# Patient Record
Sex: MALE | Race: WHITE | NOT HISPANIC OR LATINO | Employment: OTHER | URBAN - METROPOLITAN AREA
[De-identification: names, ages, dates, MRNs, and addresses within clinical notes are randomized per-mention and may not be internally consistent; named-entity substitution may affect disease eponyms.]

---

## 2017-02-01 ENCOUNTER — HOSPITAL ENCOUNTER (OUTPATIENT)
Dept: LAB | Facility: MEDICAL CENTER | Age: 52
End: 2017-02-01
Attending: SPECIALIST
Payer: COMMERCIAL

## 2017-02-01 LAB
25(OH)D3 SERPL-MCNC: 13 NG/ML (ref 30–100)
ALBUMIN SERPL BCP-MCNC: 4.5 G/DL (ref 3.2–4.9)
ALBUMIN/GLOB SERPL: 1.4 G/DL
ALP SERPL-CCNC: 54 U/L (ref 30–99)
ALT SERPL-CCNC: 42 U/L (ref 2–50)
ANION GAP SERPL CALC-SCNC: 10 MMOL/L (ref 0–11.9)
AST SERPL-CCNC: 33 U/L (ref 12–45)
BASOPHILS # BLD AUTO: 0.05 K/UL (ref 0–0.12)
BASOPHILS NFR BLD AUTO: 1 % (ref 0–1.8)
BILIRUB SERPL-MCNC: 1 MG/DL (ref 0.1–1.5)
BUN SERPL-MCNC: 11 MG/DL (ref 8–22)
CALCIUM SERPL-MCNC: 9.5 MG/DL (ref 8.5–10.5)
CHLORIDE SERPL-SCNC: 102 MMOL/L (ref 96–112)
CHOLEST SERPL-MCNC: 269 MG/DL (ref 100–199)
CO2 SERPL-SCNC: 26 MMOL/L (ref 20–33)
CREAT SERPL-MCNC: 0.85 MG/DL (ref 0.5–1.4)
EOSINOPHIL # BLD: 0.26 K/UL (ref 0–0.51)
EOSINOPHIL NFR BLD AUTO: 5.3 % (ref 0–6.9)
ERYTHROCYTE [DISTWIDTH] IN BLOOD BY AUTOMATED COUNT: 42.1 FL (ref 35.9–50)
GLOBULIN SER CALC-MCNC: 3.2 G/DL (ref 1.9–3.5)
GLUCOSE SERPL-MCNC: 101 MG/DL (ref 65–99)
HCT VFR BLD AUTO: 52.3 % (ref 42–52)
HDLC SERPL-MCNC: 35 MG/DL
HGB BLD-MCNC: 17.6 G/DL (ref 14–18)
IMM GRANULOCYTES # BLD AUTO: 0.02 K/UL (ref 0–0.11)
IMM GRANULOCYTES NFR BLD AUTO: 0.4 % (ref 0–0.9)
LDLC SERPL CALC-MCNC: 169 MG/DL
LYMPHOCYTES # BLD: 1.26 K/UL (ref 1–4.8)
LYMPHOCYTES NFR BLD AUTO: 25.8 % (ref 22–41)
MCH RBC QN AUTO: 31.4 PG (ref 27–33)
MCHC RBC AUTO-ENTMCNC: 33.7 G/DL (ref 33.7–35.3)
MCV RBC AUTO: 93.4 FL (ref 81.4–97.8)
MONOCYTES # BLD: 0.35 K/UL (ref 0–0.85)
MONOCYTES NFR BLD AUTO: 7.2 % (ref 0–13.4)
NEUTROPHILS # BLD: 2.94 K/UL (ref 1.82–7.42)
NEUTROPHILS NFR BLD AUTO: 60.3 % (ref 44–72)
NRBC # BLD AUTO: 0 K/UL
NRBC BLD-RTO: 0 /100 WBC
PLATELET # BLD AUTO: 201 K/UL (ref 164–446)
PMV BLD AUTO: 8.8 FL (ref 9–12.9)
POTASSIUM SERPL-SCNC: 4.1 MMOL/L (ref 3.6–5.5)
PROT SERPL-MCNC: 7.7 G/DL (ref 6–8.2)
RBC # BLD AUTO: 5.6 M/UL (ref 4.7–6.1)
SODIUM SERPL-SCNC: 138 MMOL/L (ref 135–145)
TRIGL SERPL-MCNC: 325 MG/DL (ref 0–149)
WBC # BLD AUTO: 4.9 K/UL (ref 4.8–10.8)

## 2017-02-01 PROCEDURE — 36415 COLL VENOUS BLD VENIPUNCTURE: CPT

## 2017-02-01 PROCEDURE — 80061 LIPID PANEL: CPT

## 2017-02-01 PROCEDURE — 80053 COMPREHEN METABOLIC PANEL: CPT

## 2017-02-01 PROCEDURE — 85025 COMPLETE CBC W/AUTO DIFF WBC: CPT

## 2017-02-01 PROCEDURE — 82306 VITAMIN D 25 HYDROXY: CPT

## 2017-05-22 ENCOUNTER — TELEPHONE (OUTPATIENT)
Dept: MEDICAL GROUP | Facility: CLINIC | Age: 52
End: 2017-05-22

## 2017-05-22 NOTE — TELEPHONE ENCOUNTER
ESTABLISHED PATIENT PRE-VISIT PLANNING     Note: Patient will not be contacted if there is no indication to call.     1.  Reviewed note from last office visit with PCP and/or other med group provider: Yes    2.  If any orders were placed at last visit, do we have Results/Consult Notes?        •  Labs - Labs ordered, completed and results are in chart.       •  Imaging - Imaging was not ordered at last office visit.       •  Referrals - No referrals were ordered at last office visit.    3.  Immunizations were updated in [facility] using WebIZ?: Yes       •  Web Iz Recommendations: Hep B, adult Tdap MMR CPOX (Varicella) Hep A, adult Influenza w/preserv.      4.  Patient is due for the following Health Maintenance Topics:   Health Maintenance Due   Topic Date Due   • IMM DTaP/Tdap/Td Vaccine (1 - Tdap) 04/01/1986   • COLONOSCOPY  04/01/2017           5.  Patient was not informed to arrive 15 min prior to their scheduled appointment and bring in their medication bottles.

## 2017-05-23 ENCOUNTER — OFFICE VISIT (OUTPATIENT)
Dept: MEDICAL GROUP | Facility: CLINIC | Age: 52
End: 2017-05-23
Payer: COMMERCIAL

## 2017-05-23 VITALS
WEIGHT: 216 LBS | DIASTOLIC BLOOD PRESSURE: 82 MMHG | RESPIRATION RATE: 18 BRPM | SYSTOLIC BLOOD PRESSURE: 132 MMHG | TEMPERATURE: 98.4 F | HEIGHT: 72 IN | OXYGEN SATURATION: 96 % | HEART RATE: 72 BPM | BODY MASS INDEX: 29.26 KG/M2

## 2017-05-23 DIAGNOSIS — F17.200 TOBACCO DEPENDENCE: ICD-10-CM

## 2017-05-23 DIAGNOSIS — Z23 NEED FOR TDAP VACCINATION: ICD-10-CM

## 2017-05-23 DIAGNOSIS — Z12.5 PROSTATE CANCER SCREENING: ICD-10-CM

## 2017-05-23 DIAGNOSIS — Z00.00 LABORATORY EXAMINATION ORDERED AS PART OF A ROUTINE GENERAL MEDICAL EXAMINATION: ICD-10-CM

## 2017-05-23 DIAGNOSIS — Z00.00 ROUTINE GENERAL MEDICAL EXAMINATION AT A HEALTH CARE FACILITY: ICD-10-CM

## 2017-05-23 DIAGNOSIS — Z12.11 COLON CANCER SCREENING: ICD-10-CM

## 2017-05-23 PROCEDURE — 90471 IMMUNIZATION ADMIN: CPT | Performed by: FAMILY MEDICINE

## 2017-05-23 PROCEDURE — 99396 PREV VISIT EST AGE 40-64: CPT | Mod: 25 | Performed by: FAMILY MEDICINE

## 2017-05-23 PROCEDURE — 90715 TDAP VACCINE 7 YRS/> IM: CPT | Performed by: FAMILY MEDICINE

## 2017-05-23 ASSESSMENT — ENCOUNTER SYMPTOMS
BLOOD IN STOOL: 0
INSOMNIA: 0
COUGH: 0
DOUBLE VISION: 0
EYE PAIN: 0
NAUSEA: 0
SHORTNESS OF BREATH: 0
WEAKNESS: 0
NERVOUS/ANXIOUS: 0
BLURRED VISION: 0
FEVER: 0
WEIGHT LOSS: 0
MEMORY LOSS: 0
VOMITING: 0
SPUTUM PRODUCTION: 0
HEADACHES: 0
SORE THROAT: 0
SEIZURES: 0
PALPITATIONS: 0
DIAPHORESIS: 0
LOSS OF CONSCIOUSNESS: 0
SPEECH CHANGE: 0
BRUISES/BLEEDS EASILY: 0
DIZZINESS: 0
HEMOPTYSIS: 0
ABDOMINAL PAIN: 0
NECK PAIN: 0
TREMORS: 0
CHILLS: 0
SENSORY CHANGE: 0
CONSTIPATION: 0
HEARTBURN: 0
TINGLING: 0
HALLUCINATIONS: 0
DEPRESSION: 0
MYALGIAS: 0
ORTHOPNEA: 0
BACK PAIN: 0
DIARRHEA: 0
FOCAL WEAKNESS: 0
WHEEZING: 0

## 2017-05-23 ASSESSMENT — PATIENT HEALTH QUESTIONNAIRE - PHQ9: CLINICAL INTERPRETATION OF PHQ2 SCORE: 0

## 2017-05-23 ASSESSMENT — LIFESTYLE VARIABLES: SUBSTANCE_ABUSE: 0

## 2017-05-23 NOTE — MR AVS SNAPSHOT
"        Jadiel R Porter   2017 3:00 PM   Office Visit   MRN: [id number]    Department:  [facility]   Dept Phone:  [phone]    Description:  Male : 1965   Provider:  Janie Mo M.D.           Reason for Visit     Annual Exam           Allergies as of 2017     No Known Allergies      You were diagnosed with     Routine general medical examination at a health care facility   [V70.0.ICD-9-CM]       Laboratory examination ordered as part of a routine general medical examination   [V72.62.ICD-9-CM]       Colon cancer screening   [886076]       Need for Tdap vaccination   [077518]       Tobacco dependence   [895550]       Prostate cancer screening   [334793]         Vital Signs     Blood Pressure Pulse Temperature Respirations Height Weight    132/82 mmHg 72 36.9 °C (98.4 °F) 18 1.836 m (6' 0.3\") 97.977 kg (216 lb)    Body Mass Index Oxygen Saturation Smoking Status             29.07 kg/m2 96% Current Every Day Smoker         Basic Information     Date Of Birth Sex Race Ethnicity Preferred Language    1965 Male White Non- English      Problem List              ICD-10-CM Priority Class Noted - Resolved    Rheumatoid arthritis involving multiple joints (CMS-HCC) M06.9   Unknown - Present    Dyslipidemia E78.5   Unknown - Present    Steatohepatitis K75.81   Unknown - Present    Vitamin D deficiency disease E55.9   Unknown - Present    Tobacco dependence F17.200   2015 - Present    Coronary artery calcification seen on CAT scan I25.10   2015 - Present      Health Maintenance        Date Due Completion Dates    IMM DTaP/Tdap/Td Vaccine (1 - Tdap) 1986 ---    COLONOSCOPY 2017 ---            Current Immunizations     Hepatitis B Vaccine Recombivax (Adol/Adult) 3/1/2004    Pneumococcal polysaccharide vaccine (PPSV-23) 3/17/2014    Tdap Vaccine  Incomplete      Below and/or attached are the medications your provider expects you to take. Review all of your home " medications and newly ordered medications with your provider and/or pharmacist. Follow medication instructions as directed by your provider and/or pharmacist. Please keep your medication list with you and share with your provider. Update the information when medications are discontinued, doses are changed, or new medications (including over-the-counter products) are added; and carry medication information at all times in the event of emergency situations     Allergies:  No Known Allergies          Medications  Valid as of: May 23, 2017 -  4:33 PM    Generic Name Brand Name Tablet Size Instructions for use    Cholecalciferol (Cap) Vitamin D 1000 UNIT Take 1 Cap by mouth every day.        Ergocalciferol (Cap) DRISDOL 28632 UNITS         Etanercept (Solution Prefilled Syringe) Etanercept 25 MG/0.5ML Inject 25 mg as instructed 2X A WEEK.        .                 Medicines prescribed today were sent to:     [facility]/PHARMACY #[id number] - [place], [place] - [address] [place] [address]    Phone: [phone] Fax: [phone]    Open 24 Hours?: No      Medication refill instructions:       If your prescription bottle indicates you have medication refills left, it is not necessary to call your provider’s office. Please contact your pharmacy and they will refill your medication.    If your prescription bottle indicates you do not have any refills left, you may request refills at any time through one of the following ways: The online dotSyntax system (except Urgent Care), by calling your provider’s office, or by asking your pharmacy to contact your provider’s office with a refill request. Medication refills are processed only during regular business hours and may not be available until the next business day. Your provider may request additional information or to have a follow-up visit with you prior to refilling your medication.   *Please Note: Medication refills are assigned a new Rx number when refilled electronically.  Your pharmacy may indicate that no refills were authorized even though a new prescription for the same medication is available at the pharmacy. Please request the medicine by name with the pharmacy before contacting your provider for a refill.        Your To Do List     Future Labs/Procedures Complete By Expires    CBC WITHOUT DIFFERENTIAL  As directed 11/23/2017    COMP METABOLIC PANEL  As directed 5/24/2018    LIPID PROFILE  As directed 5/24/2018    PROSTATE SPECIFIC AG SCREENING  As directed 5/24/2018    URINALYSIS,CULTURE IF INDICATED  As directed 5/24/2018      Referral     A referral request has been sent to our patient care coordination department. Please allow 3-5 business days for us to process this request and contact you either by phone or mail. If you do not hear from us by the 5th business day, please call us at [phone].           MyChart Status: Patient Declined        Quit Tobacco Information     Do you want to quit using tobacco?    Quitting tobacco decreases risks of cancer, heart and lung disease, increases life expectancy, improves sense of taste and smell, and increases spending money, among other benefits.    If you are thinking about quitting, we can help.  • [facility] Quit Tobacco Program: [phone]  o Program occurs weekly for four weeks and includes pharmacist consultation on products to support quitting smoking or chewing tobacco. A provider referral is needed for pharmacist consultation.  • Tobacco Users Help Hotline: 6-923-QUITNOW (381-4152) or https://[place].quitlogix.org/  o Free, confidential telephone and online coaching for [place] residents. Sessions are designed on a schedule that is convenient for you. Eligible clients receive free nicotine replacement therapy.  • Nationally: www.smokefree.gov  o Information and professional assistance to support both immediate and long-term needs as you become, and remain, a non-smoker. Smokefree.gov allows you to choose the help that best  fits your needs.

## 2017-05-23 NOTE — PROGRESS NOTES
Subjective:      Jadiel Buenrostro is a 52 y.o. male who presents with Annual Exam        He is here for his general medical examination.      Annual Exam  Pertinent negatives include no abdominal pain, chest pain, chills, congestion, coughing, diaphoresis, fever, headaches, myalgias, nausea, neck pain, rash, sore throat, vomiting or weakness.    has a past medical history of Rheumatoid arthritis (CMS-HCC); Dyslipidemia; Vitamin d deficiency; and Steatohepatitis.   has no past surgical history on file.  family history includes Heart Disease in his maternal grandfather; Non-contributory in his father.   reports that he has been smoking Cigarettes.  He has a 18 pack-year smoking history. He has never used smokeless tobacco. He reports that he drinks about 4.2 oz of alcohol per week. He reports that he does not use illicit drugs.      Current outpatient prescriptions:   •  Etanercept (ENBREL) 25 MG/0.5ML SOSY, Inject 25 mg as instructed 2X A WEEK., Disp: , Rfl:   •  Cholecalciferol (VITAMIN D) 1000 UNIT CAPS, Take 1 Cap by mouth every day., Disp: 30 Each, Rfl: 0  •  vitamin D, Ergocalciferol, (DRISDOL) 34265 UNITS Cap capsule, , Disp: , Rfl:   has No Known Allergies.      Review of Systems   Constitutional: Negative for fever, chills, weight loss, malaise/fatigue and diaphoresis.   HENT: Negative for congestion, hearing loss, sore throat and tinnitus.    Eyes: Negative for blurred vision, double vision and pain.   Respiratory: Negative for cough, hemoptysis, sputum production, shortness of breath and wheezing.    Cardiovascular: Negative for chest pain, palpitations, orthopnea and leg swelling.   Gastrointestinal: Negative for heartburn, nausea, vomiting, abdominal pain, diarrhea, constipation and blood in stool.   Genitourinary: Negative for dysuria, urgency, frequency and hematuria.   Musculoskeletal: Positive for joint pain. Negative for myalgias, back pain and neck pain.        Follows with rheumatology, has been  "stable.   Skin: Negative for rash.   Neurological: Negative for dizziness, tingling, tremors, sensory change, speech change, focal weakness, seizures, loss of consciousness, weakness and headaches.   Endo/Heme/Allergies: Negative for environmental allergies. Does not bruise/bleed easily.   Psychiatric/Behavioral: Negative for depression, suicidal ideas, hallucinations, memory loss and substance abuse. The patient is not nervous/anxious and does not have insomnia.           Objective:     /82 mmHg  Pulse 72  Temp(Src) 36.9 °C (98.4 °F)  Resp 18  Ht 1.836 m (6' 0.3\")  Wt 97.977 kg (216 lb)  BMI 29.07 kg/m2  SpO2 96%     Physical Exam   Constitutional: He is oriented to person, place, and time. He appears well-developed and well-nourished.   HENT:   Head: Normocephalic and atraumatic.   Mouth/Throat: Oropharynx is clear and moist.   Eyes: EOM are normal. Pupils are equal, round, and reactive to light. Left eye exhibits no discharge.   Neck: Normal range of motion. Neck supple. No JVD present. No thyromegaly present.   Cardiovascular: Normal rate, regular rhythm and normal heart sounds.    No murmur heard.  Pulmonary/Chest: Effort normal and breath sounds normal. No respiratory distress. He has no wheezes. He has no rales.   Abdominal: Soft. Bowel sounds are normal. He exhibits no distension and no mass. There is no tenderness.   Musculoskeletal: Normal range of motion. He exhibits no edema.   Distortion MCP joints bilaterally, worse right hand, ankles bilaterally   Lymphadenopathy:     He has no cervical adenopathy.   Neurological: He is alert and oriented to person, place, and time. Coordination normal.   Skin: Skin is warm and dry. Rash noted. No erythema.   Rash lower legs consistent with eczema   Psychiatric: He has a normal mood and affect. His behavior is normal.   Vitals reviewed.              Assessment/Plan:     1. Routine general medical examination at a health care facility  He is medically " stable. He is working full time and is very active.    2. Laboratory examination ordered as part of a routine general medical examination  Routine orders placed  - COMP METABOLIC PANEL; Future  - LIPID PROFILE; Future  - CBC WITHOUT DIFFERENTIAL; Future  - URINALYSIS,CULTURE IF INDICATED; Future    3. Colon cancer screening  Referral discussed and placed  - REFERRAL TO GASTROENTEROLOGY    4. Need for Tdap vaccination  Administered today  - TDAP VACCINE =>6YO IM    5. Tobacco dependence  Referral discussed and placed  - REFERRAL TO TOBACCO CESSATION PROGRAM    6. Prostate cancer screening  Screening tests discussed  - PROSTATE SPECIFIC AG SCREENING; Future

## 2017-05-26 DIAGNOSIS — L23.9 ECZEMA, ALLERGIC: ICD-10-CM

## 2017-05-26 RX ORDER — TRIAMCINOLONE ACETONIDE 1 MG/G
OINTMENT TOPICAL
Qty: 30 G | Refills: 2 | Status: SHIPPED | OUTPATIENT
Start: 2017-05-26 | End: 2018-12-27

## 2017-06-28 ENCOUNTER — HOSPITAL ENCOUNTER (OUTPATIENT)
Dept: LAB | Facility: MEDICAL CENTER | Age: 52
End: 2017-06-28
Attending: FAMILY MEDICINE
Payer: COMMERCIAL

## 2017-06-28 DIAGNOSIS — Z00.00 LABORATORY EXAMINATION ORDERED AS PART OF A ROUTINE GENERAL MEDICAL EXAMINATION: ICD-10-CM

## 2017-06-28 DIAGNOSIS — Z12.5 PROSTATE CANCER SCREENING: ICD-10-CM

## 2017-06-28 LAB
ALBUMIN SERPL BCP-MCNC: 3.9 G/DL (ref 3.2–4.9)
ALBUMIN/GLOB SERPL: 1.4 G/DL
ALP SERPL-CCNC: 50 U/L (ref 30–99)
ALT SERPL-CCNC: 25 U/L (ref 2–50)
ANION GAP SERPL CALC-SCNC: 7 MMOL/L (ref 0–11.9)
AST SERPL-CCNC: 21 U/L (ref 12–45)
BILIRUB SERPL-MCNC: 1 MG/DL (ref 0.1–1.5)
BUN SERPL-MCNC: 10 MG/DL (ref 8–22)
CALCIUM SERPL-MCNC: 9.1 MG/DL (ref 8.5–10.5)
CHLORIDE SERPL-SCNC: 105 MMOL/L (ref 96–112)
CHOLEST SERPL-MCNC: 218 MG/DL (ref 100–199)
CO2 SERPL-SCNC: 27 MMOL/L (ref 20–33)
CREAT SERPL-MCNC: 0.8 MG/DL (ref 0.5–1.4)
ERYTHROCYTE [DISTWIDTH] IN BLOOD BY AUTOMATED COUNT: 44.2 FL (ref 35.9–50)
GFR SERPL CREATININE-BSD FRML MDRD: >60 ML/MIN/1.73 M 2
GLOBULIN SER CALC-MCNC: 2.8 G/DL (ref 1.9–3.5)
GLUCOSE SERPL-MCNC: 91 MG/DL (ref 65–99)
HCT VFR BLD AUTO: 49.7 % (ref 42–52)
HDLC SERPL-MCNC: 33 MG/DL
HGB BLD-MCNC: 16.4 G/DL (ref 14–18)
LDLC SERPL CALC-MCNC: 156 MG/DL
MCH RBC QN AUTO: 31.4 PG (ref 27–33)
MCHC RBC AUTO-ENTMCNC: 33 G/DL (ref 33.7–35.3)
MCV RBC AUTO: 95 FL (ref 81.4–97.8)
PLATELET # BLD AUTO: 194 K/UL (ref 164–446)
PMV BLD AUTO: 9.4 FL (ref 9–12.9)
POTASSIUM SERPL-SCNC: 4.2 MMOL/L (ref 3.6–5.5)
PROT SERPL-MCNC: 6.7 G/DL (ref 6–8.2)
PSA SERPL-MCNC: 2.17 NG/ML (ref 0–4)
RBC # BLD AUTO: 5.23 M/UL (ref 4.7–6.1)
SODIUM SERPL-SCNC: 139 MMOL/L (ref 135–145)
TRIGL SERPL-MCNC: 144 MG/DL (ref 0–149)
WBC # BLD AUTO: 4.7 K/UL (ref 4.8–10.8)

## 2017-06-28 PROCEDURE — 80061 LIPID PANEL: CPT

## 2017-06-28 PROCEDURE — 84153 ASSAY OF PSA TOTAL: CPT

## 2017-06-28 PROCEDURE — 80053 COMPREHEN METABOLIC PANEL: CPT

## 2017-06-28 PROCEDURE — 36415 COLL VENOUS BLD VENIPUNCTURE: CPT

## 2017-06-28 PROCEDURE — 85027 COMPLETE CBC AUTOMATED: CPT

## 2017-08-02 ENCOUNTER — HOSPITAL ENCOUNTER (OUTPATIENT)
Dept: OTHER | Facility: MEDICAL CENTER | Age: 52
End: 2017-08-02
Attending: FAMILY MEDICINE
Payer: COMMERCIAL

## 2017-08-02 PROCEDURE — S9453 SMOKING CESSATION CLASS: HCPCS

## 2017-08-02 NOTE — RESPIRATORY CARE
"Patient attended week 1 of Quit Tobacco class from 11 am to 12 pm. Patient has not quit smoking. Patient has not had a pharmacy consult . Patient has not set a quit date. This week we covered the health effects of smoking, having a personal plan to quit, the benefits of quitting, and the 5 day count down to quit. The patient received the first part of their personal Tobacco Quit Plan \"Reasons For Quitting\". We will se him back next week for week 2.  "

## 2017-08-09 ENCOUNTER — HOSPITAL ENCOUNTER (OUTPATIENT)
Dept: OTHER | Facility: MEDICAL CENTER | Age: 52
End: 2017-08-09
Attending: FAMILY MEDICINE
Payer: COMMERCIAL

## 2017-08-09 PROCEDURE — S9453 SMOKING CESSATION CLASS: HCPCS

## 2017-08-09 NOTE — RESPIRATORY CARE
"Patient attended week 2 Quit Tobacco class from 11 am to 12 pm.  Patient has not quit smoking. Patient has not set a Quit Date . This week we covered \"Why Do I Smoke?\" The Smokers ([place]) Self-Test and review the results. The patient received the second part of their personalized Tobacco Quit Plan and Trigger List to complete.  We will see him back next week for week 3.  "

## 2017-08-14 ENCOUNTER — NON-PROVIDER VISIT (OUTPATIENT)
Dept: VASCULAR LAB | Facility: MEDICAL CENTER | Age: 52
End: 2017-08-14
Attending: INTERNAL MEDICINE
Payer: COMMERCIAL

## 2017-08-14 DIAGNOSIS — F17.200 TOBACCO DEPENDENCE: ICD-10-CM

## 2017-08-14 PROCEDURE — 99407 BEHAV CHNG SMOKING > 10 MIN: CPT

## 2017-08-14 NOTE — MR AVS SNAPSHOT
Jaidel CHRISTENSEN Porter   2017 3:30 PM   Non-Provider Visit   MRN: [id number]    Department:  Vascular Medicine   Dept Phone:  [phone]    Description:  Male : 1965   Provider:  [facility] EXAM 5           Allergies as of 2017     No Known Allergies      Vital Signs     Smoking Status                   Current Every Day Smoker           Basic Information     Date Of Birth Sex Race Ethnicity Preferred Language    1965 Male White Non- English      Your appointments     Aug 17, 2017  6:00 PM   Smoking Cessation Class with PULMONARY REHAB PM CLASS   PULMONARY LAB OP [facility] (--)    [address]  [place] [id number] [phone]           From [facility]/ [address], take the [address] exit [address]) and head west on [address].  Turn right on [address]. Park in the [address] Parking garage, or you may use our  parking.  The [facility] entrance is left of the  parking by the red and pink heart sculpture.            Aug 23, 2017 11:00 AM   Smoking Cessation Class with PULMONARY REHAB PM CLASS   PULMONARY LAB OP [facility] (--)    [address]  [place] [id number] [phone]           From [facility] take the [address] exit [address]) and head west on [address].  Turn right on [address]. Park in the [address] Parking garage, or you may use our  parking.  The [facility] entrance is left of the  parking by the red and pink heart sculpture.            Aug 28, 2017  4:00 PM   Smoking Cessation Consult with [facility] EXAM 5   [facility] [place]  (--)    [address]  [place] [address]   [phone]            Sep 05, 2017  4:00 PM   Established Patient with Janie Mo M.D.   [address]  [place] [address]   [phone]           You will be receiving a confirmation call a few days before your appointment from our automated call  confirmation system.              Problem List              ICD-10-CM Priority Class Noted - Resolved    Rheumatoid arthritis involving multiple joints (CMS-HCC) M06.9   Unknown - Present    Dyslipidemia E78.5   Unknown - Present    Steatohepatitis K75.81   Unknown - Present    Vitamin D deficiency disease E55.9   Unknown - Present    Tobacco dependence F17.200   7/17/2015 - Present    Coronary artery calcification seen on CAT scan I25.10   7/17/2015 - Present      Health Maintenance        Date Due Completion Dates    COLONOSCOPY 4/1/2017 ---    IMM INFLUENZA (1) 9/1/2017 ---    IMM DTaP/Tdap/Td Vaccine (2 - Td) 5/23/2027 5/23/2017            Current Immunizations     Hepatitis B Vaccine Recombivax (Adol/Adult) 3/1/2004    Pneumococcal polysaccharide vaccine (PPSV-23) 3/17/2014    Tdap Vaccine 5/23/2017      Below and/or attached are the medications your provider expects you to take. Review all of your home medications and newly ordered medications with your provider and/or pharmacist. Follow medication instructions as directed by your provider and/or pharmacist. Please keep your medication list with you and share with your provider. Update the information when medications are discontinued, doses are changed, or new medications (including over-the-counter products) are added; and carry medication information at all times in the event of emergency situations     Allergies:  No Known Allergies          Medications  Valid as of: August 14, 2017 -  3:58 PM    Generic Name Brand Name Tablet Size Instructions for use    Cholecalciferol (Cap) Vitamin D 1000 UNIT Take 1 Cap by mouth every day.        Ergocalciferol (Cap) DRISDOL 04499 UNITS         Etanercept (Solution Prefilled Syringe) Etanercept 25 MG/0.5ML Inject 25 mg as instructed 2X A WEEK.        Triamcinolone Acetonide (Ointment) KENALOG 0.1 % Apply to affected area bid        .                 Medicines prescribed today were sent to:     [facility]/PHARMACY #[id number] -  [place], [place] - [address]    [address] [place] [address]    Phone: [phone] Fax: [phone]    Open 24 Hours?: No      Medication refill instructions:       If your prescription bottle indicates you have medication refills left, it is not necessary to call your provider’s office. Please contact your pharmacy and they will refill your medication.    If your prescription bottle indicates you do not have any refills left, you may request refills at any time through one of the following ways: The online Trustlook system (except Urgent Care), by calling your provider’s office, or by asking your pharmacy to contact your provider’s office with a refill request. Medication refills are processed only during regular business hours and may not be available until the next business day. Your provider may request additional information or to have a follow-up visit with you prior to refilling your medication.   *Please Note: Medication refills are assigned a new Rx number when refilled electronically. Your pharmacy may indicate that no refills were authorized even though a new prescription for the same medication is available at the pharmacy. Please request the medicine by name with the pharmacy before contacting your provider for a refill.           Trustlook Access Code: [email or web address]  Expires: 9/13/2017 10:17 AM    Trustlook  A secure, online tool to manage your health information     [employer]’s Trustlook® is a secure, online tool that connects you to your personalized health information from the privacy of your home -- day or night - making it very easy for you to manage your healthcare. Once the activation process is completed, you can even access your medical information using the Trustlook bari, which is available for free in the Apple Bari store or Google Play store.     Trustlook provides the following levels of access (as shown below):   My Chart Features   [facility] Primary Care Doctor [facility]  Specialists  [facility]  Urgent  Care [facility]  Primary Care  Doctor   Email your healthcare team securely and privately 24/7 X X X    Manage appointments: schedule your next appointment; view details of past/upcoming appointments X      Request prescription refills. X      View recent personal medical records, including lab and immunizations X X X X   View health record, including health history, allergies, medications X X X X   Read reports about your outpatient visits, procedures, consult and ER notes X X X X   See your discharge summary, which is a recap of your hospital and/or ER visit that includes your diagnosis, lab results, and care plan. X X       How to register for [employer]:  1. Go to  [email or web address].  2. Click on the Sign Up Now box, which takes you to the New Member Sign Up page. You will need to provide the following information:  a. Enter your [employer] Access Code exactly as it appears at the top of this page. (You will not need to use this code after you’ve completed the sign-up process. If you do not sign up before the expiration date, you must request a new code.)   b. Enter your date of birth.   c. Enter your home email address.   d. Click Submit, and follow the next screen’s instructions.  3. Create a [employer] ID. This will be your [employer] login ID and cannot be changed, so think of one that is secure and easy to remember.  4. Create a [employer] password. You can change your password at any time.  5. Enter your Password Reset Question and Answer. This can be used at a later time if you forget your password.   6. Enter your e-mail address. This allows you to receive e-mail notifications when new information is available in [employer].  7. Click Sign Up. You can now view your health information.    For assistance activating your [employer] account, call [phone]        Quit Tobacco Information     Do you want to quit using tobacco?    Quitting tobacco decreases risks of cancer, heart and lung disease,  increases life expectancy, improves sense of taste and smell, and increases spending money, among other benefits.    If you are thinking about quitting, we can help.  • [facility] Quit Tobacco Program: 867.230.5886  o Program occurs weekly for four weeks and includes pharmacist consultation on products to support quitting smoking or chewing tobacco. A provider referral is needed for pharmacist consultation.  • Tobacco Users Help Hotline: 5-942-QUIT-NOW (399-3197) or https://[place].quitlogix.org/  o Free, confidential telephone and online coaching for [place] residents. Sessions are designed on a schedule that is convenient for you. Eligible clients receive free nicotine replacement therapy.  • Nationally: www.smokefree.gov  o Information and professional assistance to support both immediate and long-term needs as you become, and remain, a non-smoker. Smokefree.gov allows you to choose the help that best fits your needs.

## 2017-08-14 NOTE — PROGRESS NOTES
"Outpatient Pharmacotherapy Program    Smoking Cessation Note  Time in: 3:36  Time out: 3:58  Reason for Visit: Patient presents for smoking cessation pharmacotherapy  Initial Visit    HPI:    Age at Which Started Smokin's  Average number of cigarettes smoked per day: 10-20  Additional Smoking Hx: None  Pertinent Psych Hx: None  Recent quit attempts: Cold turkey each year but has not been successful.    Medications reviewed and reconciled    Pt states he tried NRT in the past but it didn't do well.  The best he ever did was with a natural supplement, he was successful for 2-3 weeks.  He cannot remember the name of the natural supplement.    Vitals:  BP:    139/71         HR:   68    Assessment:    Tobacco use disorder, willing to make quit attempt with a combination of pharmacological and behavioral therapy.    Plan:    Behavioral Modification Recommended: Specifically [facility] Smoking Cessation Classes    Quit Date: or other plans to reduce cigarette usage: Still thinking of a quit date he can commit to.      Chantix            Start Pack   Pt had multiple questions about the medication.  Stated he did not tolerate NRT well in the past and it made him feel extremely \"jittery\".  Explained how to use the medication and when to begin start pack.  He is not committed to a stop date yet but Rx provided to have in hand to optimize pt success.    Potential side-effects of all prescribed pharmacotherapy reviewed with patient who verbalizes understanding of the risks and benefits of this therapy.    Follow-up :  2 weeks.    Andrea Paulino, PHARMD    cc:  Dr Michael Bloch, Dr Janie Mo                  "

## 2017-08-17 ENCOUNTER — APPOINTMENT (OUTPATIENT)
Dept: OTHER | Facility: MEDICAL CENTER | Age: 52
End: 2017-08-17
Attending: FAMILY MEDICINE
Payer: COMMERCIAL

## 2017-08-17 DIAGNOSIS — F17.200 TOBACCO DEPENDENCE: ICD-10-CM

## 2017-08-28 ENCOUNTER — NON-PROVIDER VISIT (OUTPATIENT)
Dept: VASCULAR LAB | Facility: MEDICAL CENTER | Age: 52
End: 2017-08-28
Attending: FAMILY MEDICINE
Payer: COMMERCIAL

## 2017-08-28 VITALS — SYSTOLIC BLOOD PRESSURE: 122 MMHG | DIASTOLIC BLOOD PRESSURE: 76 MMHG | HEART RATE: 68 BPM

## 2017-08-28 PROCEDURE — 99407 BEHAV CHNG SMOKING > 10 MIN: CPT

## 2017-08-28 NOTE — PROGRESS NOTES
Outpatient Pharmacotherapy Program    Smoking Cessation Note  Time in: 4:04  Time out: 4:15  Reason for Visit: Patient presents for smoking cessation pharmacotherapy  Follow-Up Visit    HPI:  Age at Which Started Smokin's  Average number of cigarettes smoked per day: 10-20  Additional Smoking Hx: None  Pertinent Psych Hx: None  Recent quit attempts: Cold turkey each year but has not been successful.    Medications reviewed and reconciled    Pt states he tried NRT in the past but it didn't do well.  The best he ever did was with a natural supplement, he was successful for 2-3 weeks.  He cannot remember the name of the natural supplement.    Vitals:  Vitals:    17 1611   BP: 122/76   Pulse: 68         Assessment:    Tobacco use disorder, willing to make quit attempt with a combination of pharmacological and behavioral therapy.    Plan:    Behavioral Modification Recommended: Specifically [facility]oking Cessation Classes. Pt is still motivated to quit but awaiting approval of Chantix Rx    Quit Date: or other plans to reduce cigarette usage: one week after starting Chantix      Chantix            Start Pack and Maintenance Dose 1 mg BID. Patient has not started yet as prior authorization has still not gone through. Will follow up with insurance to see why it has not been approved.       Potential side-effects of all prescribed pharmacotherapy reviewed with patient who verbalizes understanding of the risks and benefits of this therapy.    Follow-up :  In pharmacotherapy clinic:  Date: 17 at 4:30 PM    Jyoti Shaver    cc:  Dr. Bloch, Dr. Mo

## 2017-09-05 ENCOUNTER — OFFICE VISIT (OUTPATIENT)
Dept: MEDICAL GROUP | Facility: CLINIC | Age: 52
End: 2017-09-05
Payer: COMMERCIAL

## 2017-09-05 VITALS
OXYGEN SATURATION: 94 % | DIASTOLIC BLOOD PRESSURE: 70 MMHG | BODY MASS INDEX: 29.39 KG/M2 | TEMPERATURE: 97.5 F | RESPIRATION RATE: 16 BRPM | WEIGHT: 217 LBS | SYSTOLIC BLOOD PRESSURE: 130 MMHG | HEART RATE: 60 BPM | HEIGHT: 72 IN

## 2017-09-05 DIAGNOSIS — I25.10 CORONARY ARTERY CALCIFICATION SEEN ON CAT SCAN: ICD-10-CM

## 2017-09-05 DIAGNOSIS — I65.22 STENOSIS OF LEFT CAROTID ARTERY: ICD-10-CM

## 2017-09-05 DIAGNOSIS — Z23 NEED FOR IMMUNIZATION AGAINST INFLUENZA: ICD-10-CM

## 2017-09-05 DIAGNOSIS — E78.5 DYSLIPIDEMIA, GOAL LDL BELOW 130: ICD-10-CM

## 2017-09-05 DIAGNOSIS — F17.200 TOBACCO DEPENDENCE: ICD-10-CM

## 2017-09-05 PROCEDURE — 90686 IIV4 VACC NO PRSV 0.5 ML IM: CPT | Performed by: FAMILY MEDICINE

## 2017-09-05 PROCEDURE — 99214 OFFICE O/P EST MOD 30 MIN: CPT | Mod: 25 | Performed by: FAMILY MEDICINE

## 2017-09-05 PROCEDURE — 90471 IMMUNIZATION ADMIN: CPT | Performed by: FAMILY MEDICINE

## 2017-09-06 ENCOUNTER — TELEPHONE (OUTPATIENT)
Dept: MEDICAL GROUP | Facility: CLINIC | Age: 52
End: 2017-09-06

## 2017-09-06 DIAGNOSIS — F17.200 TOBACCO DEPENDENCE: ICD-10-CM

## 2017-09-06 NOTE — TELEPHONE ENCOUNTER
1. Caller Name: [facility]                                         Call Back Number: [phone]      Patient approves a detailed voicemail message: yes    2. SPECIFIC Action To Be Taken: Referral pending, please sign.    3. Diagnosis/Clinical Reason for Request: tobacco cessation     4. Specialty & Provider Name/Lab/Imaging Location: pulmonology     5. Is appointment scheduled for requested order/referral: no    Patient informed they will receive a return phone call from the office ONLY if there are any questions before processing their request. Advised to call back if they haven't received a call from the referral department in 5 days.

## 2017-09-06 NOTE — PROGRESS NOTES
CC: dyslipidemia, lifeline screening results, coronary artery calcifications, tobacco use, immunization    HPI:   Jadiel presents today with the following.    1. Dyslipidemia, goal LDL below 130  Patient denies chest pain, chest pressure, palpitations or exertional shortness of breath. His lipids are mildly elevated, low HDL. Patient is a current smoker. Patient takes no aspirin daily. Patient has no history of myocardial infarction, stroke or PVD.  The problem is clinically stable.    2. Stenosis of left carotid artery  On his lifeline screening he had no significant aortic pathology.  He did have mild left carotid artery stenosis.    3. Coronary artery calcification seen on CAT scan  Mild coronary artery calcification on recent CT.  Discussed smoking cessation.    4. Tobacco dependence  Cannot get chantix as he did not complete his tobacco cessation class.    5. Need for immunization against influenza  Due for flu vaccine      Patient Active Problem List    Diagnosis Date Noted   • Carotid artery stenosis    • Tobacco dependence 07/17/2015   • Coronary artery calcification seen on CAT scan 07/17/2015   • Steatohepatitis    • Vitamin D deficiency disease    • Rheumatoid arthritis involving multiple joints (CMS-[facility])    • Dyslipidemia, goal LDL below 130        Current Outpatient Prescriptions   Medication Sig Dispense Refill   • Etanercept (ENBREL) 25 MG/0.5ML SOSY Inject 25 mg as instructed 2X A WEEK.     • Cholecalciferol (VITAMIN D) 1000 UNIT CAPS Take 1 Cap by mouth every day. 30 Each 0   • varenicline (CHANTIX STARTING MONTH PAK) 0.5 MG X 11 & 1 MG X 42 tablet Please begin starter pack as instructed by package insert. (Patient not taking: Reported on 9/5/2017) 56 Tab 3   • triamcinolone acetonide (KENALOG) 0.1 % Ointment Apply to affected area bid (Patient not taking: Reported on 9/5/2017) 30 g 2   • vitamin D, Ergocalciferol, (DRISDOL) 90435 UNITS Cap capsule        No current facility-administered medications  "for this visit.          Allergies as of 09/05/2017   • (No Known Allergies)        ROS: As per HPI.    /70   Pulse 60   Temp 36.4 °C (97.5 °F)   Resp 16   Ht 1.836 m (6' 0.3\")   Wt 98.4 kg (217 lb)   SpO2 94%   BMI 29.19 kg/m²     Physical Exam:  Gen:         Alert and oriented, No apparent distress.  Lucid and fluent.  Neck:       Full ROM, no JVD or carotid bruits.  No cervical adenopathy.  No thyromegaly.   Lungs:     Clear to auscultation bilaterally  CV:          Regular rate and rhythm. No murmurs, rubs or gallops.               Ext:          No clubbing, cyanosis, edema.    Lifeline screening reviewed, mild carotid plaque on the left.  Discussed June lab results.  Moderate lipid elevation, HDL a little low.      Assessment and Plan.   52 y.o. male with the following issues.    1. Dyslipidemia, goal LDL below 130  Recommended starting lipid lowering statin.  He does not want to do this at this time.  He wants to try to control this with diet.  Discussed risks.    2. Stenosis of left carotid artery  This is mild on lifeline screening.    3. Coronary artery calcification seen on CAT scan  Discussed risks and importance of tobacco cessation.  Discussed aspirin daily, recommended low dose.    4. Tobacco dependence  Discussed referral to tobacco cessation.    5. Need for immunization against influenza  Administered today  - INFLUENZA VACCINE QUAD INJ >3Y(PF)        "

## 2017-09-20 ENCOUNTER — HOSPITAL ENCOUNTER (OUTPATIENT)
Dept: OTHER | Facility: MEDICAL CENTER | Age: 52
End: 2017-09-20
Attending: INTERNAL MEDICINE
Payer: COMMERCIAL

## 2017-09-20 PROCEDURE — S9453 SMOKING CESSATION CLASS: HCPCS

## 2017-09-20 NOTE — RESPIRATORY CARE
Patient attended week 3 Quit Tobacco class from 11 am to 12 pm.  Patient has not quit smoking. Patient is using Chantix.  This week we covered Stress: A Primitive Response, having an emergency kit, breathing techniques, meditation and relaxation. We will see him back next week for week 4.

## 2017-09-27 ENCOUNTER — HOSPITAL ENCOUNTER (OUTPATIENT)
Dept: OTHER | Facility: MEDICAL CENTER | Age: 52
End: 2017-09-27
Attending: INTERNAL MEDICINE
Payer: COMMERCIAL

## 2017-09-27 PROCEDURE — S9453 SMOKING CESSATION CLASS: HCPCS

## 2017-09-27 NOTE — RESPIRATORY CARE
"Patient attended week 4 Quit Tobacco class from 11 am to 12 pm.  Patient did not quit smoking.. This week we covered Learning to [place] without Cigarettes, eating healthy snacks and reviewed his/her \"Tobacco Quit Plan\". He was provided with a certificate of completion for the class.      "

## 2017-11-20 ENCOUNTER — HOSPITAL ENCOUNTER (OUTPATIENT)
Dept: LAB | Facility: MEDICAL CENTER | Age: 52
End: 2017-11-20
Attending: SPECIALIST
Payer: COMMERCIAL

## 2017-11-20 LAB
25(OH)D3 SERPL-MCNC: 8 NG/ML (ref 30–100)
ALBUMIN SERPL BCP-MCNC: 3.9 G/DL (ref 3.2–4.9)
ALP SERPL-CCNC: 52 U/L (ref 30–99)
ALT SERPL-CCNC: 37 U/L (ref 2–50)
AST SERPL-CCNC: 24 U/L (ref 12–45)
BASOPHILS # BLD AUTO: 1.3 % (ref 0–1.8)
BASOPHILS # BLD: 0.06 K/UL (ref 0–0.12)
BILIRUB CONJ SERPL-MCNC: 0.1 MG/DL (ref 0.1–0.5)
BILIRUB INDIRECT SERPL-MCNC: 0.5 MG/DL (ref 0–1)
BILIRUB SERPL-MCNC: 0.6 MG/DL (ref 0.1–1.5)
CREAT SERPL-MCNC: 0.86 MG/DL (ref 0.5–1.4)
EOSINOPHIL # BLD AUTO: 0.41 K/UL (ref 0–0.51)
EOSINOPHIL NFR BLD: 8.6 % (ref 0–6.9)
ERYTHROCYTE [DISTWIDTH] IN BLOOD BY AUTOMATED COUNT: 43.6 FL (ref 35.9–50)
GFR SERPL CREATININE-BSD FRML MDRD: >60 ML/MIN/1.73 M 2
HCT VFR BLD AUTO: 52.4 % (ref 42–52)
HGB BLD-MCNC: 17.1 G/DL (ref 14–18)
IMM GRANULOCYTES # BLD AUTO: 0 K/UL (ref 0–0.11)
IMM GRANULOCYTES NFR BLD AUTO: 0 % (ref 0–0.9)
LYMPHOCYTES # BLD AUTO: 1.8 K/UL (ref 1–4.8)
LYMPHOCYTES NFR BLD: 37.7 % (ref 22–41)
MCH RBC QN AUTO: 31.6 PG (ref 27–33)
MCHC RBC AUTO-ENTMCNC: 32.6 G/DL (ref 33.7–35.3)
MCV RBC AUTO: 96.9 FL (ref 81.4–97.8)
MONOCYTES # BLD AUTO: 0.53 K/UL (ref 0–0.85)
MONOCYTES NFR BLD AUTO: 11.1 % (ref 0–13.4)
NEUTROPHILS # BLD AUTO: 1.98 K/UL (ref 1.82–7.42)
NEUTROPHILS NFR BLD: 41.3 % (ref 44–72)
NRBC # BLD AUTO: 0 K/UL
NRBC BLD AUTO-RTO: 0 /100 WBC
PLATELET # BLD AUTO: 201 K/UL (ref 164–446)
PMV BLD AUTO: 9.3 FL (ref 9–12.9)
PROT SERPL-MCNC: 7 G/DL (ref 6–8.2)
RBC # BLD AUTO: 5.41 M/UL (ref 4.7–6.1)
WBC # BLD AUTO: 4.8 K/UL (ref 4.8–10.8)

## 2017-11-20 PROCEDURE — 82306 VITAMIN D 25 HYDROXY: CPT

## 2017-11-20 PROCEDURE — 82565 ASSAY OF CREATININE: CPT

## 2017-11-20 PROCEDURE — 36415 COLL VENOUS BLD VENIPUNCTURE: CPT

## 2017-11-20 PROCEDURE — 80076 HEPATIC FUNCTION PANEL: CPT

## 2017-11-20 PROCEDURE — 85025 COMPLETE CBC W/AUTO DIFF WBC: CPT

## 2018-01-17 ENCOUNTER — TELEPHONE (OUTPATIENT)
Dept: VASCULAR LAB | Facility: MEDICAL CENTER | Age: 53
End: 2018-01-17

## 2018-01-18 NOTE — TELEPHONE ENCOUNTER
Received indication from [facility] that authorization for Chantix was denied  Will ask clinical pharmacist to investigate    Michael J. Bloch, MD  Vascular Care

## 2018-03-02 ENCOUNTER — OFFICE VISIT (OUTPATIENT)
Dept: MEDICAL GROUP | Facility: CLINIC | Age: 53
End: 2018-03-02
Payer: COMMERCIAL

## 2018-03-02 VITALS
RESPIRATION RATE: 14 BRPM | TEMPERATURE: 97.2 F | OXYGEN SATURATION: 94 % | HEIGHT: 72 IN | HEART RATE: 75 BPM | DIASTOLIC BLOOD PRESSURE: 90 MMHG | SYSTOLIC BLOOD PRESSURE: 146 MMHG | BODY MASS INDEX: 32.51 KG/M2 | WEIGHT: 240 LBS

## 2018-03-02 DIAGNOSIS — Z12.5 PROSTATE CANCER SCREENING: ICD-10-CM

## 2018-03-02 DIAGNOSIS — E78.5 DYSLIPIDEMIA, GOAL LDL BELOW 130: ICD-10-CM

## 2018-03-02 DIAGNOSIS — K75.81 STEATOHEPATITIS: ICD-10-CM

## 2018-03-02 DIAGNOSIS — E66.9 OBESITY, CLASS I, BMI 30-34.9: ICD-10-CM

## 2018-03-02 DIAGNOSIS — E55.9 VITAMIN D DEFICIENCY DISEASE: ICD-10-CM

## 2018-03-02 PROCEDURE — 99214 OFFICE O/P EST MOD 30 MIN: CPT | Performed by: FAMILY MEDICINE

## 2018-03-02 ASSESSMENT — PAIN SCALES - GENERAL: PAINLEVEL: NO PAIN

## 2018-03-02 NOTE — PROGRESS NOTES
Chief Complaint   Patient presents with   • Blood Pressure Problem   • Hyperlipidemia       Subjective:     HPI:   Jadiel Buenrostro presents today with the followin. Dyslipidemia, goal LDL below 130  Patient denies chest pain, chest pressure, palpitations or exertional shortness of breath. Patient is not on lipid-lowering medication. Patient's last lipid panel last year showed moderate cholesterol elevation with low HDL cholesterol. Discussed importance of increased fiber and vegetables in the diet and reduction of simple sugars and simple starches. Discussed importance of increased exercise. Patient is a current every day smoker. Risks are discussed and emphasized again. Patient takes no aspirin daily. Patient has no history of myocardial infarction, stroke or PVD.  The problem is clinically stable.    2. Steatohepatitis  This was seen on ultrasound in the past. Discussed fatty liver and metabolic syndrome. Patient voices understanding that he has not been watching his diet and has been eating a great deal of starch and sugar. Discussed overall good diet for fatty liver. Discussed target weight below 220. Discussed increased exercise.    3. Vitamin D deficiency disease  Discussed the importance of vitamin D supplementation. Patient did not realize that this affects liver health. States he will be doing this regularly.    4. Obesity, Class I, BMI 30-34.9  Discussed his obesity and impact on his other medical problems. Discussed improving diet and also improving exercise regimen.    5. Prostate cancer screening  Is due, normal in the past.        Patient Active Problem List    Diagnosis Date Noted   • Obesity, Class I, BMI 30-34.9    • Carotid artery stenosis    • Tobacco dependence 2015   • Coronary artery calcification seen on CAT scan 2015   • Steatohepatitis    • Vitamin D deficiency disease    • Rheumatoid arthritis involving multiple joints (CMS-HCC)    • Dyslipidemia, goal LDL below 130         Current medicines (including changes today)  Current Outpatient Prescriptions   Medication Sig Dispense Refill   • varenicline (CHANTIX STARTING MONTH GOVIND) 0.5 MG X 11 & 1 MG X 42 tablet Please begin starter pack as instructed by package insert. (Patient not taking: Reported on 9/5/2017) 56 Tab 3   • triamcinolone acetonide (KENALOG) 0.1 % Ointment Apply to affected area bid (Patient not taking: Reported on 9/5/2017) 30 g 2   • vitamin D, Ergocalciferol, (DRISDOL) 85391 UNITS Cap capsule      • Etanercept (ENBREL) 25 MG/0.5ML SOSY Inject 25 mg as instructed 2X A WEEK.     • Cholecalciferol (VITAMIN D) 1000 UNIT CAPS Take 1 Cap by mouth every day. 30 Each 0     No current facility-administered medications for this visit.        No Known Allergies    ROS: As per HPI       Objective:     Blood pressure 146/90, pulse 75, temperature 36.2 °C (97.2 °F), resp. rate 14, height 1.829 m (6'), weight 108.9 kg (240 lb), SpO2 94 %. Body mass index is 32.55 kg/m².    Physical Exam:  Constitutional: Well-developed and well-nourished. Not diaphoretic. No distress. Lucid and fluent.  Skin: Skin is warm and dry. No rash noted.  Seborrheic keratosis right clavicle remains, around 6 mm.  Head: Atraumatic without lesions.  Eyes: Conjunctivae and extraocular motions are normal. Pupils are equal, round, and reactive to light. No scleral icterus.   Nose: Nares patent. Mucosa without edema or erythema. No discharge. No facial tenderness.  Mouth/Throat: Tongue normal. Oropharynx is clear and moist. Posterior pharynx without erythema or exudates.  Neck: Supple, trachea midline. No thyromegaly present. No cervical or supraclavicular lymphadenopathy. No JVD or carotid bruits appreciated  Cardiovascular: Regular rate and rhythm.  Normal S1, S2 without murmur appreciated.  Chest: Effort normal. Clear to auscultation throughout. No adventitious sounds.   Abdomen: Soft, non tender, and without distention. Active bowel sounds in all four  quadrants. No rebound, guarding, masses or hepatosplenomegaly.  Extremities: No cyanosis, clubbing, erythema, nor edema.   Neurological: Alert and oriented x 3.   Psychiatric:  Behavior, mood, and affect are appropriate.       Assessment and Plan:     52 y.o. male with the following issues:    1. Dyslipidemia, goal LDL below 130  COMP METABOLIC PANEL    LIPID PROFILE   2. Steatohepatitis  CBC WITH DIFFERENTIAL    IRON/TOTAL IRON BIND    COMP METABOLIC PANEL   3. Vitamin D deficiency disease  VITAMIN D,25 HYDROXY   4. Obesity, Class I, BMI 30-34.9  Patient identified as having weight management issue.  Appropriate orders and counseling given.   5. Prostate cancer screening  PROSTATE SPECIFIC AG SCREENING         Followup: Return in about 6 months (around 9/2/2018), or if symptoms worsen or fail to improve.

## 2018-08-24 ENCOUNTER — OFFICE VISIT (OUTPATIENT)
Dept: MEDICAL GROUP | Facility: MEDICAL CENTER | Age: 53
End: 2018-08-24
Payer: COMMERCIAL

## 2018-08-24 VITALS
WEIGHT: 228.8 LBS | HEART RATE: 60 BPM | OXYGEN SATURATION: 97 % | SYSTOLIC BLOOD PRESSURE: 124 MMHG | BODY MASS INDEX: 31.03 KG/M2 | TEMPERATURE: 97.4 F | RESPIRATION RATE: 14 BRPM | DIASTOLIC BLOOD PRESSURE: 70 MMHG

## 2018-08-24 DIAGNOSIS — M25.512 CHRONIC LEFT SHOULDER PAIN: ICD-10-CM

## 2018-08-24 DIAGNOSIS — G89.29 CHRONIC LEFT SHOULDER PAIN: ICD-10-CM

## 2018-08-24 DIAGNOSIS — L57.0 ACTINIC KERATOSIS OF MULTIPLE SITES OF HEAD AND NECK: ICD-10-CM

## 2018-08-24 PROCEDURE — 99213 OFFICE O/P EST LOW 20 MIN: CPT | Performed by: FAMILY MEDICINE

## 2018-08-24 ASSESSMENT — PATIENT HEALTH QUESTIONNAIRE - PHQ9: CLINICAL INTERPRETATION OF PHQ2 SCORE: 0

## 2018-08-24 NOTE — PROGRESS NOTES
Chief Complaint   Patient presents with   • Nevus     right clavicle side    • Shoulder Pain     left side for 2 months post injury        Subjective:     HPI:   Jadiel Buenrostro presents today with the followin. Chronic left shoulder pain  Injured the left shoulder 2 months ago as he was jumping off a kevin into water.  He lost his balance and was flailing with the arm.  He felt a tearing sensation.  He thought this was just a strain and it would resolve in a couple weeks but unfortunately the pain has persisted.  It is primarily front of the shoulder and lateral deltoid.  He states occasionally it seems slightly swollen.  It is bothersome during the day especially if he is trying to reach but it is really interfering with sleep at times.  Discussed imaging with both x-ray and MRI.    2. Actinic keratosis of multiple sites of head and neck  He has a persistent right clavicular region actinic keratosis which I have frozen twice.  I have offered to make a dermatology referral.  He asks me to freeze it again and we will see if it will leave.  He will let me know if after a few weeks this is not progressing and I will make a referral to dermatology.  Appropriate cautions are given to the patient.     He agrees he will get his labs done.      Patient Active Problem List    Diagnosis Date Noted   • Obesity, Class I, BMI 30-34.9    • Carotid artery stenosis    • Tobacco dependence 2015   • Coronary artery calcification seen on CAT scan 2015   • Steatohepatitis    • Vitamin D deficiency disease    • Rheumatoid arthritis involving multiple joints (CMS-HCC)    • Dyslipidemia, goal LDL below 130        Current medicines (including changes today)  Current Outpatient Prescriptions   Medication Sig Dispense Refill   • triamcinolone acetonide (KENALOG) 0.1 % Ointment Apply to affected area bid (Patient not taking: Reported on 2017) 30 g 2   • vitamin D, Ergocalciferol, (DRISDOL) 40256 UNITS Cap capsule       • Etanercept (ENBREL) 25 MG/0.5ML SOSY Inject 25 mg as instructed 2X A WEEK.     • Cholecalciferol (VITAMIN D) 1000 UNIT CAPS Take 1 Cap by mouth every day. 30 Each 0     No current facility-administered medications for this visit.        Allergies   Allergen Reactions   • Chantix [Varenicline] Nausea       ROS: As per HPI       Objective:     Blood pressure 124/70, pulse 60, temperature 36.3 °C (97.4 °F), resp. rate 14, weight 103.8 kg (228 lb 12.8 oz), SpO2 97 %. Body mass index is 31.03 kg/m².    Physical Exam:  Constitutional: Well-developed and well-nourished. Not diaphoretic. No distress. Lucid and fluent.  Skin: Skin is warm and dry.  Dark thick plaque approximately 5 mm, waxy.  Edges are well demarcated.  This is about 3 cm above the clavicle just lateral to the sternoclavicular junction.    Head: Atraumatic without lesions.  Eyes: Conjunctivae and extraocular motions are normal. Pupils are equal, round, and reactive to light. No scleral icterus.   Mouth/Throat: Tongue normal. Oropharynx is clear and moist. Posterior pharynx without erythema or exudates.  Neck: Supple, trachea midline. No thyromegaly present. No cervical or supraclavicular lymphadenopathy. No JVD or carotid bruits appreciated  Cardiovascular: Regular rate and rhythm.  Normal S1, S2 without murmur appreciated.  Chest: Effort normal. Clear to auscultation throughout. No adventitious sounds.   Extremities: No cyanosis, clubbing, erythema, nor edema.   Neurological: Alert and oriented x 3.   Psychiatric:  Behavior, mood, and affect are appropriate.    The skin lesion is treated today with freezing 20 seconds and then 15 seconds.  There was satisfactory edema and moderate erythema.       Assessment and Plan:     53 y.o. male with the following issues:    1. Chronic left shoulder pain  MR-SHOULDER-W/O LEFT    DX-SHOULDER 2+ LEFT   2. Actinic keratosis of multiple sites of head and neck           Followup: Return if symptoms worsen or fail to  improve.

## 2018-09-11 ENCOUNTER — HOSPITAL ENCOUNTER (OUTPATIENT)
Dept: RADIOLOGY | Facility: MEDICAL CENTER | Age: 53
End: 2018-09-11
Attending: FAMILY MEDICINE
Payer: COMMERCIAL

## 2018-09-11 DIAGNOSIS — M25.512 CHRONIC LEFT SHOULDER PAIN: ICD-10-CM

## 2018-09-11 DIAGNOSIS — G89.29 CHRONIC LEFT SHOULDER PAIN: ICD-10-CM

## 2018-09-11 PROCEDURE — 73221 MRI JOINT UPR EXTREM W/O DYE: CPT | Mod: LT

## 2018-09-17 ENCOUNTER — HOSPITAL ENCOUNTER (OUTPATIENT)
Dept: LAB | Facility: MEDICAL CENTER | Age: 53
End: 2018-09-17
Attending: FAMILY MEDICINE
Payer: COMMERCIAL

## 2018-09-17 ENCOUNTER — PATIENT MESSAGE (OUTPATIENT)
Dept: MEDICAL GROUP | Facility: MEDICAL CENTER | Age: 53
End: 2018-09-17

## 2018-09-17 DIAGNOSIS — Z12.5 PROSTATE CANCER SCREENING: ICD-10-CM

## 2018-09-17 DIAGNOSIS — E55.9 VITAMIN D DEFICIENCY DISEASE: ICD-10-CM

## 2018-09-17 DIAGNOSIS — K75.81 STEATOHEPATITIS: ICD-10-CM

## 2018-09-17 DIAGNOSIS — E78.5 DYSLIPIDEMIA, GOAL LDL BELOW 130: ICD-10-CM

## 2018-09-17 LAB
25(OH)D3 SERPL-MCNC: 18 NG/ML (ref 30–100)
ALBUMIN SERPL BCP-MCNC: 4.4 G/DL (ref 3.2–4.9)
ALBUMIN/GLOB SERPL: 1.5 G/DL
ALP SERPL-CCNC: 56 U/L (ref 30–99)
ALT SERPL-CCNC: 67 U/L (ref 2–50)
ANION GAP SERPL CALC-SCNC: 6 MMOL/L (ref 0–11.9)
AST SERPL-CCNC: 38 U/L (ref 12–45)
BASOPHILS # BLD AUTO: 0.8 % (ref 0–1.8)
BASOPHILS # BLD: 0.04 K/UL (ref 0–0.12)
BILIRUB SERPL-MCNC: 1.2 MG/DL (ref 0.1–1.5)
BUN SERPL-MCNC: 9 MG/DL (ref 8–22)
CALCIUM SERPL-MCNC: 9.3 MG/DL (ref 8.5–10.5)
CHLORIDE SERPL-SCNC: 104 MMOL/L (ref 96–112)
CHOLEST SERPL-MCNC: 267 MG/DL (ref 100–199)
CO2 SERPL-SCNC: 26 MMOL/L (ref 20–33)
CREAT SERPL-MCNC: 0.84 MG/DL (ref 0.5–1.4)
EOSINOPHIL # BLD AUTO: 0.31 K/UL (ref 0–0.51)
EOSINOPHIL NFR BLD: 6.1 % (ref 0–6.9)
ERYTHROCYTE [DISTWIDTH] IN BLOOD BY AUTOMATED COUNT: 43.2 FL (ref 35.9–50)
FASTING STATUS PATIENT QL REPORTED: NORMAL
GLOBULIN SER CALC-MCNC: 3 G/DL (ref 1.9–3.5)
GLUCOSE SERPL-MCNC: 91 MG/DL (ref 65–99)
HCT VFR BLD AUTO: 54.7 % (ref 42–52)
HDLC SERPL-MCNC: 29 MG/DL
HGB BLD-MCNC: 18.5 G/DL (ref 14–18)
IMM GRANULOCYTES # BLD AUTO: 0.01 K/UL (ref 0–0.11)
IMM GRANULOCYTES NFR BLD AUTO: 0.2 % (ref 0–0.9)
IRON SATN MFR SERPL: 36 % (ref 15–55)
IRON SERPL-MCNC: 151 UG/DL (ref 50–180)
LDLC SERPL CALC-MCNC: 206 MG/DL
LYMPHOCYTES # BLD AUTO: 1.53 K/UL (ref 1–4.8)
LYMPHOCYTES NFR BLD: 30.1 % (ref 22–41)
MCH RBC QN AUTO: 32.1 PG (ref 27–33)
MCHC RBC AUTO-ENTMCNC: 33.8 G/DL (ref 33.7–35.3)
MCV RBC AUTO: 94.8 FL (ref 81.4–97.8)
MONOCYTES # BLD AUTO: 0.43 K/UL (ref 0–0.85)
MONOCYTES NFR BLD AUTO: 8.5 % (ref 0–13.4)
NEUTROPHILS # BLD AUTO: 2.76 K/UL (ref 1.82–7.42)
NEUTROPHILS NFR BLD: 54.3 % (ref 44–72)
NRBC # BLD AUTO: 0 K/UL
NRBC BLD-RTO: 0 /100 WBC
PLATELET # BLD AUTO: 202 K/UL (ref 164–446)
PMV BLD AUTO: 9.7 FL (ref 9–12.9)
POTASSIUM SERPL-SCNC: 4.5 MMOL/L (ref 3.6–5.5)
PROT SERPL-MCNC: 7.4 G/DL (ref 6–8.2)
PSA SERPL-MCNC: 1.88 NG/ML (ref 0–4)
RBC # BLD AUTO: 5.77 M/UL (ref 4.7–6.1)
SODIUM SERPL-SCNC: 136 MMOL/L (ref 135–145)
TIBC SERPL-MCNC: 420 UG/DL (ref 250–450)
TRIGL SERPL-MCNC: 160 MG/DL (ref 0–149)
WBC # BLD AUTO: 5.1 K/UL (ref 4.8–10.8)

## 2018-09-17 PROCEDURE — 82306 VITAMIN D 25 HYDROXY: CPT

## 2018-09-17 PROCEDURE — 83540 ASSAY OF IRON: CPT

## 2018-09-17 PROCEDURE — 36415 COLL VENOUS BLD VENIPUNCTURE: CPT

## 2018-09-17 PROCEDURE — 84153 ASSAY OF PSA TOTAL: CPT

## 2018-09-17 PROCEDURE — 85025 COMPLETE CBC W/AUTO DIFF WBC: CPT

## 2018-09-17 PROCEDURE — 83550 IRON BINDING TEST: CPT

## 2018-09-17 PROCEDURE — 80061 LIPID PANEL: CPT

## 2018-09-17 PROCEDURE — 80053 COMPREHEN METABOLIC PANEL: CPT

## 2018-09-18 ENCOUNTER — PATIENT MESSAGE (OUTPATIENT)
Dept: MEDICAL GROUP | Facility: MEDICAL CENTER | Age: 53
End: 2018-09-18

## 2018-11-15 ENCOUNTER — TELEPHONE (OUTPATIENT)
Dept: MEDICAL GROUP | Facility: MEDICAL CENTER | Age: 53
End: 2018-11-15

## 2018-11-15 NOTE — TELEPHONE ENCOUNTER
Left message with orthopedist since I do not know what they need.  It is not clear to me.  Unfortunately I got a voicemail so I left a message with them.

## 2018-11-15 NOTE — TELEPHONE ENCOUNTER
1. Caller Name: Stephanie Buenrostro                                       Call Back Number: [phone]      Patient approves a detailed voicemail message: yes     Patients wife called stating that Dr. Cai needs to get an okay from you that he gets his shoulder surgery even though you had sent them a referral.

## 2018-11-19 NOTE — TELEPHONE ENCOUNTER
I do need the last consult note from Dr. Cai in order to write a preoperative clearance letter if that is what he needs.

## 2018-12-27 DIAGNOSIS — Z01.812 PRE-OPERATIVE LABORATORY EXAMINATION: ICD-10-CM

## 2018-12-27 LAB
ALBUMIN SERPL BCP-MCNC: 4.1 G/DL (ref 3.2–4.9)
ALBUMIN/GLOB SERPL: 1.4 G/DL
ALP SERPL-CCNC: 59 U/L (ref 30–99)
ALT SERPL-CCNC: 19 U/L (ref 2–50)
ANION GAP SERPL CALC-SCNC: 4 MMOL/L (ref 0–11.9)
AST SERPL-CCNC: 16 U/L (ref 12–45)
BILIRUB SERPL-MCNC: 0.5 MG/DL (ref 0.1–1.5)
BUN SERPL-MCNC: 8 MG/DL (ref 8–22)
CALCIUM SERPL-MCNC: 9.1 MG/DL (ref 8.5–10.5)
CHLORIDE SERPL-SCNC: 106 MMOL/L (ref 96–112)
CO2 SERPL-SCNC: 29 MMOL/L (ref 20–33)
CREAT SERPL-MCNC: 0.87 MG/DL (ref 0.5–1.4)
ERYTHROCYTE [DISTWIDTH] IN BLOOD BY AUTOMATED COUNT: 44.3 FL (ref 35.9–50)
GLOBULIN SER CALC-MCNC: 2.9 G/DL (ref 1.9–3.5)
GLUCOSE SERPL-MCNC: 102 MG/DL (ref 65–99)
HCT VFR BLD AUTO: 52.9 % (ref 42–52)
HGB BLD-MCNC: 17.4 G/DL (ref 14–18)
MCH RBC QN AUTO: 31.6 PG (ref 27–33)
MCHC RBC AUTO-ENTMCNC: 32.9 G/DL (ref 33.7–35.3)
MCV RBC AUTO: 96.2 FL (ref 81.4–97.8)
PLATELET # BLD AUTO: 226 K/UL (ref 164–446)
PMV BLD AUTO: 8.9 FL (ref 9–12.9)
POTASSIUM SERPL-SCNC: 4.8 MMOL/L (ref 3.6–5.5)
PROT SERPL-MCNC: 7 G/DL (ref 6–8.2)
RBC # BLD AUTO: 5.5 M/UL (ref 4.7–6.1)
SODIUM SERPL-SCNC: 139 MMOL/L (ref 135–145)
WBC # BLD AUTO: 5.4 K/UL (ref 4.8–10.8)

## 2018-12-27 PROCEDURE — 85027 COMPLETE CBC AUTOMATED: CPT

## 2018-12-27 PROCEDURE — 36415 COLL VENOUS BLD VENIPUNCTURE: CPT

## 2018-12-27 PROCEDURE — 80053 COMPREHEN METABOLIC PANEL: CPT

## 2018-12-27 RX ORDER — ACETAMINOPHEN 325 MG/1
650 TABLET ORAL EVERY 4 HOURS PRN
COMMUNITY

## 2018-12-27 RX ORDER — ACETAMINOPHEN 500 MG
500-1000 TABLET ORAL EVERY 6 HOURS PRN
COMMUNITY
End: 2019-12-13

## 2018-12-27 RX ORDER — IBUPROFEN 200 MG
800-1200 TABLET ORAL EVERY 6 HOURS PRN
COMMUNITY

## 2018-12-27 NOTE — OR NURSING
Hx and meds reviewed, pre op instructions given. Pt aware may take the listed meds morning of surgery; tylenol if needed.Informed pt the high doses of ibuprofen ar potentially hazardous. Advised to call Dr Cai for narcotic or alternative tx for pain, aware to stop 1 week prior to surgery per protocol.  Anesthesia and Dr Cai'  fasting guidelines reviewed with pt

## 2019-01-09 ENCOUNTER — TELEPHONE (OUTPATIENT)
Dept: MEDICAL GROUP | Facility: MEDICAL CENTER | Age: 54
End: 2019-01-09

## 2019-01-09 NOTE — TELEPHONE ENCOUNTER
1. Caller Name: Michelle MASON from Dr. Bravo's offce                      Call Back Number: [phone]    2. Message: Dr. Cai office needs a Surgery Clearance letter, Dr. Bravo's Chart note is in Media. Pt has surgery tomorrow.  Please rush letter Fax to [phone] attn Michelle MASON    3. Patient approves office to leave a detailed voicemail/MyChart message: yes

## 2019-01-09 NOTE — LETTER
January 9, 2019           Dr. Cai  [facility]  [address]    Fax is [phone]    Patient: Jadiel Buenrostro   MR Number: [id number]   YOB: 1965   Date of Visit: 1/9/2019         Dear Dr. Cai;    This is a preoperative clearance letter for our mutual patient.  He was last seen in my office in August of this year.    He  has a past medical history of Arthritis; Carotid artery stenosis; Dyslipidemia; Obesity, Class I, BMI 30-34.9; Rheumatoid arthritis(714.0); Steatohepatitis; and Vitamin D deficiency disease. His  has a past surgical history that includes wrist orif (Right, 1976); wrist orif (Right, 2003); and mandible reduction closed (1985). He  reports that he has been smoking Cigarettes.  He has a 9.00 pack-year smoking history. He has never used smokeless tobacco. He reports that he drinks about 1.8 oz of alcohol per week . He reports that he does not use drugs.    He has a current medication list which includes the following prescription(s): multivitamin, ibuprofen, acetaminophen, acetaminophen, and etanercept. He is allergic to chantix [varenicline].     Recent preoperative laboratory tests on December 27, 2018 show normal kidney function.  Blood salts are normal.  Liver enzymes are also normal.  Blood proteins are normal.  Mild elevation of glucose is in the normal range for a nonfasting test.  Blood count shows no anemia or sign of infection.  Platelet level is normal.    Patient is generally healthy.  He is an average risk for surgery.  He is medically appropriate for surgery and general anesthesia.      Sincerely,                        Miguel Mo M.D.

## 2019-01-10 ENCOUNTER — HOSPITAL ENCOUNTER (OUTPATIENT)
Facility: MEDICAL CENTER | Age: 54
End: 2019-01-10
Attending: ORTHOPAEDIC SURGERY | Admitting: ORTHOPAEDIC SURGERY
Payer: COMMERCIAL

## 2019-01-10 VITALS
RESPIRATION RATE: 12 BRPM | TEMPERATURE: 97.9 F | BODY MASS INDEX: 26.62 KG/M2 | HEIGHT: 75 IN | OXYGEN SATURATION: 94 % | SYSTOLIC BLOOD PRESSURE: 140 MMHG | WEIGHT: 214.07 LBS | HEART RATE: 98 BPM | DIASTOLIC BLOOD PRESSURE: 79 MMHG

## 2019-01-10 DIAGNOSIS — G89.18 POSTOPERATIVE PAIN: ICD-10-CM

## 2019-01-10 PROCEDURE — 160022 HCHG BLOCK: Performed by: ORTHOPAEDIC SURGERY

## 2019-01-10 PROCEDURE — 700102 HCHG RX REV CODE 250 W/ 637 OVERRIDE(OP)

## 2019-01-10 PROCEDURE — 160041 HCHG SURGERY MINUTES - EA ADDL 1 MIN LEVEL 4: Performed by: ORTHOPAEDIC SURGERY

## 2019-01-10 PROCEDURE — 160009 HCHG ANES TIME/MIN: Performed by: ORTHOPAEDIC SURGERY

## 2019-01-10 PROCEDURE — A9270 NON-COVERED ITEM OR SERVICE: HCPCS | Performed by: ANESTHESIOLOGY

## 2019-01-10 PROCEDURE — A4450 NON-WATERPROOF TAPE: HCPCS | Performed by: ORTHOPAEDIC SURGERY

## 2019-01-10 PROCEDURE — A6222 GAUZE <=16 IN NO W/SAL W/O B: HCPCS | Performed by: ORTHOPAEDIC SURGERY

## 2019-01-10 PROCEDURE — 700111 HCHG RX REV CODE 636 W/ 250 OVERRIDE (IP)

## 2019-01-10 PROCEDURE — A9270 NON-COVERED ITEM OR SERVICE: HCPCS

## 2019-01-10 PROCEDURE — 160035 HCHG PACU - 1ST 60 MINS PHASE I: Performed by: ORTHOPAEDIC SURGERY

## 2019-01-10 PROCEDURE — 500152 HCHG CANNULA, TIB TUNNEL: Performed by: ORTHOPAEDIC SURGERY

## 2019-01-10 PROCEDURE — 700111 HCHG RX REV CODE 636 W/ 250 OVERRIDE (IP): Performed by: ANESTHESIOLOGY

## 2019-01-10 PROCEDURE — 160047 HCHG PACU  - EA ADDL 30 MINS PHASE II: Performed by: ORTHOPAEDIC SURGERY

## 2019-01-10 PROCEDURE — 700101 HCHG RX REV CODE 250

## 2019-01-10 PROCEDURE — 502581 HCHG PACK, SHOULDER ARTHROSCOPY: Performed by: ORTHOPAEDIC SURGERY

## 2019-01-10 PROCEDURE — 500028 HCHG ARTHROWAND TURBOVAC 3.5/90 SUCT.: Performed by: ORTHOPAEDIC SURGERY

## 2019-01-10 PROCEDURE — A4565 SLINGS: HCPCS | Performed by: ORTHOPAEDIC SURGERY

## 2019-01-10 PROCEDURE — 160046 HCHG PACU - 1ST 60 MINS PHASE II: Performed by: ORTHOPAEDIC SURGERY

## 2019-01-10 PROCEDURE — 501838 HCHG SUTURE GENERAL: Performed by: ORTHOPAEDIC SURGERY

## 2019-01-10 PROCEDURE — 160002 HCHG RECOVERY MINUTES (STAT): Performed by: ORTHOPAEDIC SURGERY

## 2019-01-10 PROCEDURE — 160048 HCHG OR STATISTICAL LEVEL 1-5: Performed by: ORTHOPAEDIC SURGERY

## 2019-01-10 PROCEDURE — 700102 HCHG RX REV CODE 250 W/ 637 OVERRIDE(OP): Performed by: ANESTHESIOLOGY

## 2019-01-10 PROCEDURE — C1713 ANCHOR/SCREW BN/BN,TIS/BN: HCPCS | Performed by: ORTHOPAEDIC SURGERY

## 2019-01-10 PROCEDURE — 160029 HCHG SURGERY MINUTES - 1ST 30 MINS LEVEL 4: Performed by: ORTHOPAEDIC SURGERY

## 2019-01-10 PROCEDURE — 160025 RECOVERY II MINUTES (STATS): Performed by: ORTHOPAEDIC SURGERY

## 2019-01-10 DEVICE — SUTURE ANCHOR HEALICOIL REGENESORB 5.5MM: Type: IMPLANTABLE DEVICE | Status: FUNCTIONAL

## 2019-01-10 RX ORDER — EPINEPHRINE 1 MG/ML(1)
AMPUL (ML) INJECTION
Status: DISCONTINUED | OUTPATIENT
Start: 2019-01-10 | End: 2019-01-10 | Stop reason: HOSPADM

## 2019-01-10 RX ORDER — ONDANSETRON 2 MG/ML
4 INJECTION INTRAMUSCULAR; INTRAVENOUS
Status: COMPLETED | OUTPATIENT
Start: 2019-01-10 | End: 2019-01-10

## 2019-01-10 RX ORDER — HYDRALAZINE HYDROCHLORIDE 20 MG/ML
5 INJECTION INTRAMUSCULAR; INTRAVENOUS
Status: DISCONTINUED | OUTPATIENT
Start: 2019-01-10 | End: 2019-01-10 | Stop reason: HOSPADM

## 2019-01-10 RX ORDER — METOPROLOL TARTRATE 1 MG/ML
1 INJECTION, SOLUTION INTRAVENOUS
Status: DISCONTINUED | OUTPATIENT
Start: 2019-01-10 | End: 2019-01-10 | Stop reason: HOSPADM

## 2019-01-10 RX ORDER — HYDROMORPHONE HYDROCHLORIDE 2 MG/ML
0.1 INJECTION, SOLUTION INTRAMUSCULAR; INTRAVENOUS; SUBCUTANEOUS
Status: DISCONTINUED | OUTPATIENT
Start: 2019-01-10 | End: 2019-01-10 | Stop reason: HOSPADM

## 2019-01-10 RX ORDER — HYDROCODONE BITARTRATE AND ACETAMINOPHEN 7.5; 325 MG/1; MG/1
1 TABLET ORAL EVERY 6 HOURS
Qty: 42 TAB | Refills: 0 | Status: SHIPPED | OUTPATIENT
Start: 2019-01-10 | End: 2019-01-17

## 2019-01-10 RX ORDER — CELECOXIB 200 MG/1
CAPSULE ORAL
Status: COMPLETED
Start: 2019-01-10 | End: 2019-01-10

## 2019-01-10 RX ORDER — GABAPENTIN 300 MG/1
300 CAPSULE ORAL ONCE
Status: COMPLETED | OUTPATIENT
Start: 2019-01-10 | End: 2019-01-10

## 2019-01-10 RX ORDER — HYDROMORPHONE HYDROCHLORIDE 2 MG/ML
0.4 INJECTION, SOLUTION INTRAMUSCULAR; INTRAVENOUS; SUBCUTANEOUS
Status: DISCONTINUED | OUTPATIENT
Start: 2019-01-10 | End: 2019-01-10 | Stop reason: HOSPADM

## 2019-01-10 RX ORDER — SODIUM CHLORIDE, SODIUM LACTATE, POTASSIUM CHLORIDE, CALCIUM CHLORIDE 600; 310; 30; 20 MG/100ML; MG/100ML; MG/100ML; MG/100ML
INJECTION, SOLUTION INTRAVENOUS ONCE
Status: COMPLETED | OUTPATIENT
Start: 2019-01-10 | End: 2019-01-10

## 2019-01-10 RX ORDER — MEPERIDINE HYDROCHLORIDE 50 MG/ML
6.25 INJECTION INTRAMUSCULAR; INTRAVENOUS; SUBCUTANEOUS
Status: DISCONTINUED | OUTPATIENT
Start: 2019-01-10 | End: 2019-01-10 | Stop reason: HOSPADM

## 2019-01-10 RX ORDER — ACETAMINOPHEN 500 MG
1000 TABLET ORAL ONCE
Status: COMPLETED | OUTPATIENT
Start: 2019-01-10 | End: 2019-01-10

## 2019-01-10 RX ORDER — DIPHENHYDRAMINE HYDROCHLORIDE 50 MG/ML
12.5 INJECTION INTRAMUSCULAR; INTRAVENOUS
Status: DISCONTINUED | OUTPATIENT
Start: 2019-01-10 | End: 2019-01-10 | Stop reason: HOSPADM

## 2019-01-10 RX ORDER — CELECOXIB 200 MG/1
200 CAPSULE ORAL ONCE
Status: COMPLETED | OUTPATIENT
Start: 2019-01-10 | End: 2019-01-10

## 2019-01-10 RX ORDER — HALOPERIDOL 5 MG/ML
1 INJECTION INTRAMUSCULAR
Status: DISCONTINUED | OUTPATIENT
Start: 2019-01-10 | End: 2019-01-10 | Stop reason: HOSPADM

## 2019-01-10 RX ORDER — BUPIVACAINE HYDROCHLORIDE AND EPINEPHRINE 5; 5 MG/ML; UG/ML
INJECTION, SOLUTION PERINEURAL
Status: DISCONTINUED | OUTPATIENT
Start: 2019-01-10 | End: 2019-01-10 | Stop reason: HOSPADM

## 2019-01-10 RX ORDER — HYDROMORPHONE HYDROCHLORIDE 2 MG/ML
0.2 INJECTION, SOLUTION INTRAMUSCULAR; INTRAVENOUS; SUBCUTANEOUS
Status: DISCONTINUED | OUTPATIENT
Start: 2019-01-10 | End: 2019-01-10 | Stop reason: HOSPADM

## 2019-01-10 RX ORDER — GABAPENTIN 300 MG/1
CAPSULE ORAL
Status: COMPLETED
Start: 2019-01-10 | End: 2019-01-10

## 2019-01-10 RX ORDER — ACETAMINOPHEN 500 MG
TABLET ORAL
Status: COMPLETED
Start: 2019-01-10 | End: 2019-01-10

## 2019-01-10 RX ADMIN — GABAPENTIN 300 MG: 300 CAPSULE ORAL at 08:19

## 2019-01-10 RX ADMIN — SODIUM CHLORIDE, SODIUM LACTATE, POTASSIUM CHLORIDE, CALCIUM CHLORIDE 1000 ML: 600; 310; 30; 20 INJECTION, SOLUTION INTRAVENOUS at 08:20

## 2019-01-10 RX ADMIN — FENTANYL CITRATE 50 MCG: 50 INJECTION, SOLUTION INTRAMUSCULAR; INTRAVENOUS at 11:34

## 2019-01-10 RX ADMIN — Medication 1000 MG: at 08:18

## 2019-01-10 RX ADMIN — ACETAMINOPHEN 1000 MG: 500 TABLET, COATED ORAL at 08:18

## 2019-01-10 RX ADMIN — Medication 0.5 ML: at 08:20

## 2019-01-10 RX ADMIN — FENTANYL CITRATE 50 MCG: 50 INJECTION, SOLUTION INTRAMUSCULAR; INTRAVENOUS at 11:42

## 2019-01-10 RX ADMIN — HALOPERIDOL LACTATE 1 MG: 5 INJECTION, SOLUTION INTRAMUSCULAR at 13:07

## 2019-01-10 RX ADMIN — ONDANSETRON 4 MG: 2 INJECTION INTRAMUSCULAR; INTRAVENOUS at 12:30

## 2019-01-10 RX ADMIN — HYDROCODONE BITARTRATE AND ACETAMINOPHEN 30 ML: 7.5; 325 SOLUTION ORAL at 11:31

## 2019-01-10 RX ADMIN — CELECOXIB 200 MG: 200 CAPSULE ORAL at 08:19

## 2019-01-10 ASSESSMENT — PAIN SCALES - GENERAL
PAINLEVEL_OUTOF10: 4
PAINLEVEL_OUTOF10: 5
PAINLEVEL_OUTOF10: 4
PAINLEVEL_OUTOF10: 4
PAINLEVEL_OUTOF10: 3
PAINLEVEL_OUTOF10: 4
PAINLEVEL_OUTOF10: 3

## 2019-01-10 NOTE — OP REPORT
DATE OF SERVICE:  01/10/2019    PREOPERATIVE DIAGNOSES:  Left shoulder large retracted rotator cuff tear,   proximal biceps rupture labral pathology, subacromial impingement,   acromioclavicular joint arthrosis.    POSTOPERATIVE DIAGNOSES:  1.  Left shoulder large retracted rotator cuff tear.  2.  Diffuse synovitis of the joint secondary to rheumatoid arthritis.  3.  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  4.  Localized osteoporosis of the proximal humerus.  5.  Subacromial impingement with a large subacromial spur.  6.  Acromioclavicular joint arthrosis.    PROCEDURES:  1.  Left shoulder diagnostic arthroscopy with arthroscopic rotator cuff   repair.  2.  Left shoulder arthroscopic extensive glenohumeral joint debridement.  3.  Left shoulder arthroscopic subacromial decompression.  4.  Left shoulder arthroscopic distal clavicle resection.    SURGEON:  Akbar Cai MD    ASSISTANT:  Tiffanie Balderas PA-C    ANESTHESIA:  General and interscalene nerve block.      ANESTHESIOLOGIST:  Dr. Collazo.      IMPLANTS:  Two 5.5 mm fully-threaded triple-loaded Smith and Nephew Regenesorb   Healicoil suture anchors.    COMPLICATIONS:  None.    DISPOSITION:  Stable to postanesthesia care unit.    INDICATIONS:  The patient is a very pleasant 53-year-old gentleman who has had   rheumatoid arthritis for years.  He sustained a large rotator cuff tear.  The   risks, benefits, alternatives, and limitations of surgical intervention were   discussed in detail.  He expressed understanding and desired to proceed.    DESCRIPTION OF PROCEDURE:  The patient and the correct operative extremity   were identified in the preoperative area.  The shoulder was marked.  He was   brought to the operating room where the correct operative extremity was again   confirmed.  He was placed supine on the OR table, he underwent an interscalene   nerve block performed at my request for  postoperative pain control.  He then   underwent general anesthesia without complication.  Examination under   anesthesia showed full range of motion, no instability.  Shoulder was prepped   with alcohol and the subacromial space injected with 30 mL of 1% lidocaine   with epinephrine.  The shoulder was then prepped and draped in the usual   sterile fashion using ChloraPrep.  Diagnostic arthroscopy was then performed,   which showed diffuse grade III chondromalacia of the humeral head and the   glenoid.  He had irregularity of the cartilage consistent with his diagnosis   of rheumatoid arthritis.  He had moderate synovitis in the joint and   synovectomy was performed.  Subscapularis was intact.  There was a large tear   of the supraspinatus extending into the infraspinatus and teres minor was   intact.  He did have a defect on the posterior humeral head consistent with a   large Hill-Sachs lesion.  The labrum was debrided.  Synovectomy was performed.    The scope was then withdrawn, replaced in the subacromial space where there   was a large amount of inflamed irritated bursa and extensive fraying on the   undersurface of the CA ligament.  Of note, the patient's proximal biceps had   ruptured and retracted out of the joint, looks chronic.  The undersurface of   the acromion was exposed, it revealed large subacromial spur and anterior   acromioplasty was performed taking the acromion down to a smooth flat type 1.    There was extensive arthrosis of the AC joint.  A circumferential resection   of 7-8 mm of the distal clavicle was performed utilizing the 70-degree   arthroscope to confirm complete circumferential resection.  The bony debris   was removed from the subacromial space.  The thickened bursa was excised   revealing the large retracted rotator cuff tear.  The cuff tissue was of very   poor quality, very flimsy fibrillated tissue, which was delaminated.  We were   able to mobilize it reasonably with just  moderate tension on the proposed   repair.  We placed two 5.5 mm triple-loaded anchors in the greater tuberosity.    He had very small tuberosity, placed some bone marrow vents around those   anchors, then placed 3 simple mattress stitches through the torn rotator cuff   and tied those all down securely with sliding locking SMC knots with   alternating stacked half hitches.  This resulted in a secure repair of the   rotator cuff.  Spinal needle was placed into the subacromial space under   direct vision.  The scope was withdrawn.  The portals closed with 3-0 nylon.    The subacromial space injected with 0.5% bupivacaine with epinephrine.    Sterile dressings were applied.  The patient was placed into an UltraSling,   allowed to awake from anesthesia, transferred to his hospital cart, and taken   to postanesthesia care unit in stable condition.  He tolerated the procedure   well.  There were no immediate complications.       ____________________________________     KENYA CASTAÑEDA MD RD / DARRELL    DD:  01/10/2019 11:19:16  DT:  01/10/2019 11:31:48    D#:  [id number]  Job#:  [id number]

## 2019-01-10 NOTE — OR NURSING
1211- Report received from stage I RN. Patient resting in chair and assisted with dressing from nursing staff.     1230- Zofran given as patient medicated for complaints of nausea.     1307-1 mg of Haldol given for continual nausea.     1330- Patient resting.     1400- Discharge instructions discussed with patient and family     1414- Patient discharged to awaiting vehicle via wheelchair.

## 2019-01-10 NOTE — DISCHARGE INSTRUCTIONS
ACTIVITY: Rest and take it easy for the first 24 hours.  A responsible adult is recommended to remain with you during that time.  It is normal to feel sleepy.  We encourage you to not do anything that requires balance, judgment or coordination.    MILD FLU-LIKE SYMPTOMS ARE NORMAL. YOU MAY EXPERIENCE GENERALIZED MUSCLE ACHES, THROAT IRRITATION, HEADACHE AND/OR SOME NAUSEA.    FOR 24 HOURS DO NOT:  Drive, operate machinery or run household appliances.  Drink beer or alcoholic beverages.   Make important decisions or sign legal documents.    SPECIAL INSTRUCTIONS: *May remove dressings Post op Day #2 and Shower with wound uncovered.  Apply bandaids after shower.  Do not soak or submerge incisions for two weeks. Ice and elevate extremity. Follow up 7-10 days. Immobilizer on at all times. No weight bearing to  operative arm.**    DIET: To avoid nausea, slowly advance diet as tolerated, avoiding spicy or greasy foods for the first day.  Add more substantial food to your diet according to your physician's instructions.  Babies can be fed formula or breast milk as soon as they are hungry.  INCREASE FLUIDS AND FIBER TO AVOID CONSTIPATION.        FOLLOW-UP APPOINTMENT:  A follow-up appointment should be arranged with your doctor in *7-10 days;  call if not already scheduled.    You should: CALL YOUR PHYSICIAN if you develop:  Fever greater than 101 degrees F.  Pain not relieved by medication, or persistent nausea or vomiting.  Excessive bleeding (blood soaking through dressing) or unexpected drainage from the wound.  Extreme redness or swelling around the incision site, drainage of pus or foul smelling drainage.  Inability to urinate or empty your bladder within 8 hours.  Problems with breathing or chest pain.    You should call 911 if you develop problems with breathing or chest pain.  If you are unable to contact your doctor or surgical center, you should go to the nearest emergency room or urgent care center.   Physician's telephone #: *[phone]    If any questions arise, call your doctor.  If your doctor is not available, please feel free to call the Surgical Center at [phone].  The Center is open Monday through Friday from 7AM to 7PM.  You can also call the HEALTH HOTLINE open 24 hours/day, 7 days/week and speak to a nurse at [phone], or toll free at [phone].    A registered nurse may call you a few days after your surgery to see how you are doing after your procedure.    MEDICATIONS: Resume taking daily medication.  Take prescribed pain medication with food.  If no medication is prescribed, you may take non-aspirin pain medication if needed.  PAIN MEDICATION CAN BE VERY CONSTIPATING.  Take a stool softener or laxative such as senokot, pericolace, or milk of magnesia if needed.    Prescription given for **NORCO*.  Last pain medication given at *11:31 AM.  If your physician has prescribed pain medication that includes Acetaminophen (Tylenol), do not take additional Acetaminophen (Tylenol) while taking the prescribed medication.    Depression / Suicide Risk    As you are discharged from this [facility] facility, it is important to learn how to keep safe from harming yourself.    Recognize the warning signs:  · Abrupt changes in personality, positive or negative- including increase in energy   · Giving away possessions  · Change in eating patterns- significant weight changes-  positive or negative  · Change in sleeping patterns- unable to sleep or sleeping all the time   · Unwillingness or inability to communicate  · Depression  · Unusual sadness, discouragement and loneliness  · Talk of wanting to die  · Neglect of personal appearance   · Rebelliousness- reckless behavior  · Withdrawal from people/activities they love  · Confusion- inability to concentrate     If you or a loved one observes any of these behaviors or has concerns about self-harm, here's what you can do:  · Talk about it- your feelings  and reasons for harming yourself  · Remove any means that you might use to hurt yourself (examples: pills, rope, extension cords, firearm)  · Get professional help from the community (Mental Health, Substance Abuse, psychological counseling)  · Do not be alone:Call your Safe Contact- someone whom you trust who will be there for you.  · Call your local CRISIS HOTLINE 801-1186 or 483-824-6312  · Call your local Children's Mobile Crisis Response Team [place] (276) 576-3535 or [employer]  · Call the toll free National Suicide Prevention Hotlines   · National Suicide Prevention Lifeline 880-091-DOVR (4265)  School Admissions Line Network 800-SUICIDE (813-2164)    Peripheral Nerve Block Discharge Instructions from Same Day Surgery and Inpatient :    What to Expect - Upper Extremity  · You may experience numbness and weakness in your left shoulder, arm and hand.  This is normal. For some people, this may be an unpleasant sensation. Be very careful with your numb limb  · Ask for help when you need it  Shoulder Surgery Side Effects  · In addition to numbness and weakness you may experience other symptoms  · Other nerves that are close to those nerves injected can also be affected by local anesthesia  · You may experience a hoarseness in your voice  · Your breathing may feel different  · You may also notice drooping of your eyelid, pupil constriction, and decreased sweating, on the side of your surgery  · All of these side effects are normal and will resolve when the local anesthetic wears off   Prevent Injury  · Protect the limb like a baby  · Beware of exposing your limb to extreme heat or cold or trauma  · The limb may be injured without you noticing because it is numb  · Keep the limb elevated whenever possible  · Do not sleep on the limb  · Change the position of the limb regularly  · Avoid putting pressure on your surgical limb  Pain Control  · The initial block on the day of surgery will make your extremity  "feel \"numb\"  · Any consecutive injection including prior to discharge from the hospital will make your extremity feel \"numb\"  · You may feel an aching or burning when the local anesthesia starts to wear off  · Take pain pills as prescribed by your surgeon  · Call your surgeon or anesthesiologist if you do not have adequate pain control  ·   "

## 2019-01-10 NOTE — OR NURSING
1130 Received report from MADELEINE Holcomb. Pt states he is having 5/10 pain to armpit. Pt denies nausea. VSS. Plan to medicate. See MAR   1145 VSS . Report back to MADELEINE Holcomb

## 2019-01-10 NOTE — OR NURSING
Respirations easy in PACU.  Gauze dressing clean and dry to left shoulder.  Immobilizer in place.   Fingers are warm and mobile with brisk capillary refill, nailbeds pink.   HOB elevated.  Having some discomfort, meds with good effect.

## 2019-07-16 ENCOUNTER — HOSPITAL ENCOUNTER (OUTPATIENT)
Dept: LAB | Facility: MEDICAL CENTER | Age: 54
End: 2019-07-16
Attending: SPECIALIST
Payer: COMMERCIAL

## 2019-07-16 LAB
25(OH)D3 SERPL-MCNC: 15 NG/ML (ref 30–100)
ALBUMIN SERPL BCP-MCNC: 4.1 G/DL (ref 3.2–4.9)
ALP SERPL-CCNC: 53 U/L (ref 30–99)
ALT SERPL-CCNC: 15 U/L (ref 2–50)
AST SERPL-CCNC: 14 U/L (ref 12–45)
BASOPHILS # BLD AUTO: 0.7 % (ref 0–1.8)
BASOPHILS # BLD: 0.06 K/UL (ref 0–0.12)
BILIRUB CONJ SERPL-MCNC: 0.1 MG/DL (ref 0.1–0.5)
BILIRUB INDIRECT SERPL-MCNC: 0.6 MG/DL (ref 0–1)
BILIRUB SERPL-MCNC: 0.7 MG/DL (ref 0.1–1.5)
EOSINOPHIL # BLD AUTO: 0.24 K/UL (ref 0–0.51)
EOSINOPHIL NFR BLD: 2.8 % (ref 0–6.9)
ERYTHROCYTE [DISTWIDTH] IN BLOOD BY AUTOMATED COUNT: 44.9 FL (ref 35.9–50)
HCT VFR BLD AUTO: 49.1 % (ref 42–52)
HGB BLD-MCNC: 15.9 G/DL (ref 14–18)
IMM GRANULOCYTES # BLD AUTO: 0.02 K/UL (ref 0–0.11)
IMM GRANULOCYTES NFR BLD AUTO: 0.2 % (ref 0–0.9)
LYMPHOCYTES # BLD AUTO: 1.65 K/UL (ref 1–4.8)
LYMPHOCYTES NFR BLD: 19 % (ref 22–41)
MCH RBC QN AUTO: 31.5 PG (ref 27–33)
MCHC RBC AUTO-ENTMCNC: 32.4 G/DL (ref 33.7–35.3)
MCV RBC AUTO: 97.2 FL (ref 81.4–97.8)
MONOCYTES # BLD AUTO: 0.52 K/UL (ref 0–0.85)
MONOCYTES NFR BLD AUTO: 6 % (ref 0–13.4)
NEUTROPHILS # BLD AUTO: 6.18 K/UL (ref 1.82–7.42)
NEUTROPHILS NFR BLD: 71.3 % (ref 44–72)
NRBC # BLD AUTO: 0 K/UL
NRBC BLD-RTO: 0 /100 WBC
PLATELET # BLD AUTO: 227 K/UL (ref 164–446)
PMV BLD AUTO: 9 FL (ref 9–12.9)
PROT SERPL-MCNC: 6.8 G/DL (ref 6–8.2)
RBC # BLD AUTO: 5.05 M/UL (ref 4.7–6.1)
WBC # BLD AUTO: 8.7 K/UL (ref 4.8–10.8)

## 2019-07-16 PROCEDURE — 80076 HEPATIC FUNCTION PANEL: CPT

## 2019-07-16 PROCEDURE — 82306 VITAMIN D 25 HYDROXY: CPT

## 2019-07-16 PROCEDURE — 85025 COMPLETE CBC W/AUTO DIFF WBC: CPT

## 2019-07-16 PROCEDURE — 36415 COLL VENOUS BLD VENIPUNCTURE: CPT

## 2019-12-11 ENCOUNTER — HOSPITAL ENCOUNTER (OUTPATIENT)
Dept: LAB | Facility: MEDICAL CENTER | Age: 54
End: 2019-12-11
Attending: SPECIALIST
Payer: COMMERCIAL

## 2019-12-11 LAB
25(OH)D3 SERPL-MCNC: 29 NG/ML (ref 30–100)
ALBUMIN SERPL BCP-MCNC: 4.4 G/DL (ref 3.2–4.9)
ALBUMIN/GLOB SERPL: 1.8 G/DL
ALP SERPL-CCNC: 64 U/L (ref 30–99)
ALT SERPL-CCNC: 11 U/L (ref 2–50)
ANION GAP SERPL CALC-SCNC: 11 MMOL/L (ref 0–11.9)
AST SERPL-CCNC: 16 U/L (ref 12–45)
BASOPHILS # BLD AUTO: 0.5 % (ref 0–1.8)
BASOPHILS # BLD: 0.05 K/UL (ref 0–0.12)
BILIRUB SERPL-MCNC: 0.8 MG/DL (ref 0.1–1.5)
BUN SERPL-MCNC: 14 MG/DL (ref 8–22)
CALCIUM SERPL-MCNC: 9.2 MG/DL (ref 8.5–10.5)
CHLORIDE SERPL-SCNC: 99 MMOL/L (ref 96–112)
CO2 SERPL-SCNC: 26 MMOL/L (ref 20–33)
CREAT SERPL-MCNC: 0.88 MG/DL (ref 0.5–1.4)
EOSINOPHIL # BLD AUTO: 0.16 K/UL (ref 0–0.51)
EOSINOPHIL NFR BLD: 1.5 % (ref 0–6.9)
ERYTHROCYTE [DISTWIDTH] IN BLOOD BY AUTOMATED COUNT: 44.7 FL (ref 35.9–50)
GLOBULIN SER CALC-MCNC: 2.5 G/DL (ref 1.9–3.5)
GLUCOSE SERPL-MCNC: 101 MG/DL (ref 65–99)
HCT VFR BLD AUTO: 49.1 % (ref 42–52)
HGB BLD-MCNC: 16.2 G/DL (ref 14–18)
IMM GRANULOCYTES # BLD AUTO: 0.04 K/UL (ref 0–0.11)
IMM GRANULOCYTES NFR BLD AUTO: 0.4 % (ref 0–0.9)
LYMPHOCYTES # BLD AUTO: 1.18 K/UL (ref 1–4.8)
LYMPHOCYTES NFR BLD: 11.3 % (ref 22–41)
MCH RBC QN AUTO: 32.6 PG (ref 27–33)
MCHC RBC AUTO-ENTMCNC: 33 G/DL (ref 33.7–35.3)
MCV RBC AUTO: 98.8 FL (ref 81.4–97.8)
MONOCYTES # BLD AUTO: 0.83 K/UL (ref 0–0.85)
MONOCYTES NFR BLD AUTO: 7.9 % (ref 0–13.4)
NEUTROPHILS # BLD AUTO: 8.19 K/UL (ref 1.82–7.42)
NEUTROPHILS NFR BLD: 78.4 % (ref 44–72)
NRBC # BLD AUTO: 0 K/UL
NRBC BLD-RTO: 0 /100 WBC
PLATELET # BLD AUTO: 280 K/UL (ref 164–446)
PMV BLD AUTO: 8.7 FL (ref 9–12.9)
POTASSIUM SERPL-SCNC: 5.4 MMOL/L (ref 3.6–5.5)
PROT SERPL-MCNC: 6.9 G/DL (ref 6–8.2)
RBC # BLD AUTO: 4.97 M/UL (ref 4.7–6.1)
SODIUM SERPL-SCNC: 136 MMOL/L (ref 135–145)
TSH SERPL DL<=0.005 MIU/L-ACNC: 2.08 UIU/ML (ref 0.38–5.33)
WBC # BLD AUTO: 10.5 K/UL (ref 4.8–10.8)

## 2019-12-11 PROCEDURE — 86480 TB TEST CELL IMMUN MEASURE: CPT

## 2019-12-11 PROCEDURE — 82306 VITAMIN D 25 HYDROXY: CPT

## 2019-12-11 PROCEDURE — 85025 COMPLETE CBC W/AUTO DIFF WBC: CPT

## 2019-12-11 PROCEDURE — 84443 ASSAY THYROID STIM HORMONE: CPT

## 2019-12-11 PROCEDURE — 80053 COMPREHEN METABOLIC PANEL: CPT

## 2019-12-11 PROCEDURE — 36415 COLL VENOUS BLD VENIPUNCTURE: CPT

## 2019-12-13 ENCOUNTER — OFFICE VISIT (OUTPATIENT)
Dept: MEDICAL GROUP | Facility: MEDICAL CENTER | Age: 54
End: 2019-12-13
Payer: COMMERCIAL

## 2019-12-13 VITALS
SYSTOLIC BLOOD PRESSURE: 142 MMHG | DIASTOLIC BLOOD PRESSURE: 68 MMHG | HEART RATE: 73 BPM | WEIGHT: 181 LBS | TEMPERATURE: 98.1 F | HEIGHT: 75 IN | RESPIRATION RATE: 14 BRPM | OXYGEN SATURATION: 97 % | BODY MASS INDEX: 22.5 KG/M2

## 2019-12-13 DIAGNOSIS — R11.2 NAUSEA AND VOMITING IN ADULT: ICD-10-CM

## 2019-12-13 DIAGNOSIS — G89.29 CHRONIC LEFT SHOULDER PAIN: ICD-10-CM

## 2019-12-13 DIAGNOSIS — E55.9 VITAMIN D DEFICIENCY DISEASE: ICD-10-CM

## 2019-12-13 DIAGNOSIS — M06.9 RHEUMATOID ARTHRITIS INVOLVING MULTIPLE JOINTS (HCC): ICD-10-CM

## 2019-12-13 DIAGNOSIS — R63.4 UNINTENTIONAL WEIGHT LOSS: ICD-10-CM

## 2019-12-13 DIAGNOSIS — M25.512 CHRONIC LEFT SHOULDER PAIN: ICD-10-CM

## 2019-12-13 DIAGNOSIS — F17.200 TOBACCO DEPENDENCE: ICD-10-CM

## 2019-12-13 PROCEDURE — 99214 OFFICE O/P EST MOD 30 MIN: CPT | Performed by: FAMILY MEDICINE

## 2019-12-13 RX ORDER — TOFACITINIB 11 MG/1
TABLET, FILM COATED, EXTENDED RELEASE ORAL
COMMUNITY
Start: 2019-12-03 | End: 2021-11-17

## 2019-12-13 RX ORDER — ERGOCALCIFEROL 1.25 MG/1
CAPSULE ORAL
Refills: 0 | COMMUNITY
Start: 2019-11-06 | End: 2023-03-22

## 2019-12-13 SDOH — HEALTH STABILITY: MENTAL HEALTH: HOW MANY STANDARD DRINKS CONTAINING ALCOHOL DO YOU HAVE ON A TYPICAL DAY?: 1 OR 2

## 2019-12-13 SDOH — HEALTH STABILITY: MENTAL HEALTH: HOW OFTEN DO YOU HAVE A DRINK CONTAINING ALCOHOL?: 4 OR MORE TIMES A WEEK

## 2019-12-13 SDOH — HEALTH STABILITY: MENTAL HEALTH: HOW OFTEN DO YOU HAVE 6 OR MORE DRINKS ON ONE OCCASION?: NEVER

## 2019-12-14 NOTE — PROGRESS NOTES
Chief Complaint   Patient presents with   • Weight Loss   • Pain   • Nausea       Subjective:     HPI:   Jadiel Buenrostro presents today with the followin. Unintentional weight loss  Patient is here for lab results review and to discuss weight loss.  His family is concerned.  He feels his weight loss is primarily due to the severe pain he injured after his shoulder surgery in January of this year.  He feels his appetite is still somewhat low but he is eating at least one full meal a day and is having 1 protein shake daily.  He is trying to eat well and eat more.  This does not appear to me to be a satisfactory explanation for his weight loss.  He states he is actually now beginning to regain weight.  He does have vomiting with coffee and nausea.  Denies abdominal pain.  Denies change in bowel pattern or blood in the stool.  Recent labs were obtained regarding the weight loss.  His family wants him to pursue these issues as his mother and sister are dealing with cancer.  Denies night sweats.  Denies pain anywhere other than some residual pain in the shoulder.  Recent lab review is normal.  Patient is having occasional extra or heavy heartbeats but denies sustained palpitations, shortness of breath or dizziness.  We will consider an upper GI series or referral to GI for endoscopy.  Patient will let me know if he does not feel his energy level and weight improved with continued exercise.    2. Nausea and vomiting in adult  Patient finds he is getting nausea and vomiting especially after he drinks coffee in the morning.  He continues chronic alcohol consumption though his description is fairly moderate.    3. Chronic left shoulder pain  Patient had shoulder surgery in January of this year.  The pain was quite extreme and he feels it took him several months to get to where the pain was under reasonable control.  He is doing his PT exercises but still has not regained his full range of motion.    4. Vitamin D  deficiency disease  Vitamin D level is still low but improved.  He is taking 50,000 units once weekly.  I have asked him to add 2000 units of vitamin D3 daily as well.   Patient also has history of dyslipidemia and fatty liver and vitamin D deficiency disease.    5. Rheumatoid arthritis involving multiple joints (CMS-HCC)  He was recently switched by his rheumatologist from Enbrel which he had been on many years to Xeljanz.  Xeljanz can be associated with weight loss but he has only been on that a month and the weight loss was before then.      6. Tobacco dependence  Patient is trying to quit smoking and would like to know if he can take the Chantix with Xeljanz.  I have informed the patient that he can.  He continues alcohol approximately 5 days a week 2-3 beers at a time maximum.  This is not a change.  Patient denies any substance use other than alcohol and tobacco.        Patient Active Problem List    Diagnosis Date Noted   • Rotator cuff syndrome of left shoulder and allied disorders 09/18/2018   • Obesity, Class I, BMI 30-34.9    • Carotid artery stenosis    • Tobacco dependence 07/17/2015   • Coronary artery calcification seen on CAT scan 07/17/2015   • Steatohepatitis    • Vitamin D deficiency disease    • Rheumatoid arthritis involving multiple joints (CMS-[facility])    • Dyslipidemia, goal LDL below 130        Current medicines (including changes today)  Current Outpatient Medications   Medication Sig Dispense Refill   • ergocalciferol (DRISDOL) 86244 UNIT capsule TAKE 1 CAPSULE BY MOUTH ONE TIME PER WEEK  0   • XELJANZ XR 11 MG TABLET SR 24 HR      • ibuprofen (MOTRIN) 200 MG Tab Take 800-1,200 mg by mouth every 6 hours as needed.     • acetaminophen (TYLENOL) 325 MG Tab Take 650 mg by mouth every four hours as needed.       No current facility-administered medications for this visit.        Allergies   Allergen Reactions   • Chantix [Varenicline] Nausea       ROS: As per HPI       Objective:     /68    "Pulse 73   Temp 36.7 °C (98.1 °F)   Resp 14   Ht 1.905 m (6' 3\")   Wt 82.1 kg (181 lb)   SpO2 97%  Body mass index is 22.62 kg/m².    Physical Exam:  Constitutional: Well-developed and well-nourished. Not diaphoretic. No distress. Lucid and fluent.  He has visibly lost weight  Skin: Skin is warm and dry. No rash noted.  Head: Atraumatic without lesions.  Eyes: Conjunctivae and extraocular motions are normal. Pupils are equal, round, and reactive to light. No scleral icterus.   Mouth/Throat: Tongue normal. Oropharynx is clear and moist. Posterior pharynx without erythema or exudates.  Neck: Supple, trachea midline. No thyromegaly present. No cervical or supraclavicular lymphadenopathy. No JVD or carotid bruits appreciated  Cardiovascular: Regular rate and rhythm.  Normal S1, S2 without murmur appreciated.  Chest: Effort normal. Clear to auscultation throughout. No adventitious sounds.   Abdomen: Soft, non tender, and without distention. Active bowel sounds in all four quadrants. No rebound, guarding, masses or hepatosplenomegaly.  Xiphoid process is prominent but nontender.  Extremities: No cyanosis, clubbing, erythema, nor edema.  Deformity of multiple joints, most noticeable in hands.  No redness or heat or swelling to the joints at this time.  Neurological: Alert and oriented x 3.   Psychiatric:  Behavior, mood, and affect are appropriate.    Lab results from December 11 reviewed and discussed with patient.  25 hydroxy vitamin D level was 29, slightly below normal.  GFR is greater than 60, normal.  Sodium potassium chloride are normal.  Glucose is minimally elevated at 101, patient states he was not really fasting.  Creatinine was 0.88, normal.  AST and ALT liver enzymes are normal in the teens.  Blood proteins are normal.  TSH is normal at 2.080, CBC is normal with hemoglobin of 16.2 platelet count is also normal.  Neutrophil percentage is slightly elevated but otherwise unremarkable differential, no lipid " panel available       Assessment and Plan:     54 y.o. male with the following issues:    1. Unintentional weight loss     2. Nausea and vomiting in adult     3. Chronic left shoulder pain     4. Vitamin D deficiency disease     5. Rheumatoid arthritis involving multiple joints (CMS-[facility])     6. Tobacco dependence           Followup: Return in about 3 months (around 3/13/2020), or if symptoms worsen or fail to improve.

## 2019-12-20 LAB — TEST NAME 95000: NORMAL

## 2020-02-27 ENCOUNTER — OFFICE VISIT (OUTPATIENT)
Dept: MEDICAL GROUP | Facility: MEDICAL CENTER | Age: 55
End: 2020-02-27
Payer: COMMERCIAL

## 2020-02-27 VITALS
BODY MASS INDEX: 22.5 KG/M2 | RESPIRATION RATE: 14 BRPM | WEIGHT: 181 LBS | OXYGEN SATURATION: 98 % | TEMPERATURE: 98.4 F | DIASTOLIC BLOOD PRESSURE: 76 MMHG | SYSTOLIC BLOOD PRESSURE: 134 MMHG | HEIGHT: 75 IN | HEART RATE: 72 BPM

## 2020-02-27 DIAGNOSIS — F17.200 TOBACCO DEPENDENCE: ICD-10-CM

## 2020-02-27 DIAGNOSIS — E78.5 DYSLIPIDEMIA, GOAL LDL BELOW 130: ICD-10-CM

## 2020-02-27 DIAGNOSIS — R63.4 UNINTENTIONAL WEIGHT LOSS: ICD-10-CM

## 2020-02-27 DIAGNOSIS — I65.22 STENOSIS OF LEFT CAROTID ARTERY: ICD-10-CM

## 2020-02-27 PROCEDURE — 99214 OFFICE O/P EST MOD 30 MIN: CPT | Performed by: FAMILY MEDICINE

## 2020-02-27 RX ORDER — ASPIRIN 81 MG/1
81 TABLET ORAL DAILY
Qty: 90 TAB | Refills: 3 | COMMUNITY
Start: 2020-02-27

## 2020-02-27 ASSESSMENT — PATIENT HEALTH QUESTIONNAIRE - PHQ9: CLINICAL INTERPRETATION OF PHQ2 SCORE: 0

## 2020-02-27 NOTE — PROGRESS NOTES
Chief Complaint   Patient presents with   • Weight Loss     Per his rheumatologist   • Loss of Appetite   • Hyperlipidemia       Subjective:     HPI:   Jadiel Buenrostro presents today with the followin. Unintentional weight loss  Has been under severe stress.  He lost his appetite and lost weight.  He feels this is gradually improving.  His weight is stable compared to December.  He is still a good 15 pounds or more under his usual weight.  However, he states he feels well at this weight and wants to know if he needs to gain.  His BMI calculates very favorably and I told him that as long as he concentrates on eating a varied diet he does not need to concentrate on weight gain.    2. Dyslipidemia, goal LDL below 130  Patient denies chest pain, chest pressure, palpitations or exertional shortness of breath. Patient is not on lipid-lowering medication and would prefer not to use this.  His lipid levels are moderately elevated with relatively low protective cholesterol. Patient is a current some day smoker. Patient takes 81 mg aspirin occasionally, have asked him to take it more regularly. Patient has no history of myocardial infarction, stroke or PVD.  Follow-up lab orders are discussed and placed.    3. Tobacco dependence  Patient does continue to smoke but states he is weaning down.  He now goes a few days a week without smoking.  He is trying to get off completely.    4. Stenosis of left carotid artery  Discussed that a [employer] screening showed mild atherosclerotic plaque in the left carotid artery.  He does have elevated lipids as well.  He asked what he could do to prevent progression.  I have told him frankly that the #1 thing for him to do would be to stop smoking.  Lipid-lowering medication might help slow the progression but stopping smoking is key.  He has been taking 81 mg aspirin episodically.  I have asked him to take this at least 3 times a week.        Patient Active Problem List     "Diagnosis Date Noted   • Rotator cuff syndrome of left shoulder and allied disorders 09/18/2018   • Obesity, Class I, BMI 30-34.9    • Carotid artery stenosis    • Tobacco dependence 07/17/2015   • Coronary artery calcification seen on CAT scan 07/17/2015   • Steatohepatitis    • Vitamin D deficiency disease    • Rheumatoid arthritis involving multiple joints (CMS-HCC)    • Dyslipidemia, goal LDL below 130        Current medicines (including changes today)  Current Outpatient Medications   Medication Sig Dispense Refill   • aspirin 81 MG EC tablet Take 1 Tab by mouth every day. 90 Tab 3   • ergocalciferol (DRISDOL) 00445 UNIT capsule TAKE 1 CAPSULE BY MOUTH ONE TIME PER WEEK  0   • XELJANZ XR 11 MG TABLET SR 24 HR      • ibuprofen (MOTRIN) 200 MG Tab Take 800-1,200 mg by mouth every 6 hours as needed.     • acetaminophen (TYLENOL) 325 MG Tab Take 650 mg by mouth every four hours as needed.       No current facility-administered medications for this visit.        Allergies   Allergen Reactions   • Chantix [Varenicline] Nausea       ROS: As per HPI       Objective:     /76 (BP Location: Right arm, Patient Position: Sitting, BP Cuff Size: Adult)   Pulse 72   Temp 36.9 °C (98.4 °F) (Temporal)   Resp 14   Ht 1.905 m (6' 3\")   Wt 82.1 kg (181 lb)   SpO2 98%  Body mass index is 22.62 kg/m².    Physical Exam:  Constitutional: Well-developed and well-nourished. Not diaphoretic. No distress. Lucid and fluent.  Skin: Skin is warm and dry. No rash noted.  Head: Atraumatic without lesions.  Eyes: Conjunctivae and extraocular motions are normal. Pupils are equal, round, and reactive to light. No scleral icterus.   Mouth/Throat: Tongue normal. Oropharynx is clear and moist. Posterior pharynx without erythema or exudates.  Neck: Supple, trachea midline. No thyromegaly present. No cervical or supraclavicular lymphadenopathy. No JVD or carotid bruits appreciated  Cardiovascular: Regular rate and rhythm.  Normal S1, S2 " without murmur appreciated.  Chest: Effort normal. Clear to auscultation throughout. No adventitious sounds.  Good air movement.  Abdomen: Soft, non tender, and without distention. Active bowel sounds in all four quadrants. No rebound, guarding, masses or hepatosplenomegaly.  Extremities: No cyanosis, clubbing, erythema, nor edema.  Extremity range of motion has improved.  Patient has stable very marked rheumatoid joint deformities.  Neurological: Alert and oriented x 3.   Psychiatric:  Behavior, mood, and affect are appropriate.    Recent lab results reviewed and discussed in detail, generally favorable.       Assessment and Plan:     54 y.o. male with the following issues:    1. Unintentional weight loss  Comp Metabolic Panel   2. Dyslipidemia, goal LDL below 130  Lipid Profile    DISCONTINUED: aspirin EC (ECOTRIN) 81 MG Tablet Delayed Response   3. Tobacco dependence  DISCONTINUED: aspirin EC (ECOTRIN) 81 MG Tablet Delayed Response   4. Stenosis of left carotid artery  DISCONTINUED: aspirin EC (ECOTRIN) 81 MG Tablet Delayed Response         Followup: Return in about 6 months (around 8/27/2020), or if symptoms worsen or fail to improve.

## 2020-10-01 ENCOUNTER — APPOINTMENT (OUTPATIENT)
Dept: LAB | Facility: MEDICAL CENTER | Age: 55
End: 2020-10-01
Attending: FAMILY MEDICINE
Payer: COMMERCIAL

## 2020-10-21 ENCOUNTER — HOSPITAL ENCOUNTER (OUTPATIENT)
Dept: LAB | Facility: MEDICAL CENTER | Age: 55
End: 2020-10-21
Attending: SPECIALIST
Payer: COMMERCIAL

## 2020-10-21 LAB
ALBUMIN SERPL BCP-MCNC: 4.2 G/DL (ref 3.2–4.9)
ALP SERPL-CCNC: 66 U/L (ref 30–99)
ALT SERPL-CCNC: 14 U/L (ref 2–50)
AST SERPL-CCNC: 16 U/L (ref 12–45)
BASOPHILS # BLD AUTO: 0.5 % (ref 0–1.8)
BASOPHILS # BLD: 0.04 K/UL (ref 0–0.12)
BILIRUB CONJ SERPL-MCNC: <0.2 MG/DL (ref 0.1–0.5)
BILIRUB INDIRECT SERPL-MCNC: NORMAL MG/DL (ref 0–1)
BILIRUB SERPL-MCNC: 0.5 MG/DL (ref 0.1–1.5)
CREAT SERPL-MCNC: 0.87 MG/DL (ref 0.5–1.4)
EOSINOPHIL # BLD AUTO: 0.09 K/UL (ref 0–0.51)
EOSINOPHIL NFR BLD: 1 % (ref 0–6.9)
ERYTHROCYTE [DISTWIDTH] IN BLOOD BY AUTOMATED COUNT: 43.4 FL (ref 35.9–50)
HCT VFR BLD AUTO: 46.2 % (ref 42–52)
HGB BLD-MCNC: 15.8 G/DL (ref 14–18)
IMM GRANULOCYTES # BLD AUTO: 0.03 K/UL (ref 0–0.11)
IMM GRANULOCYTES NFR BLD AUTO: 0.3 % (ref 0–0.9)
LYMPHOCYTES # BLD AUTO: 1.51 K/UL (ref 1–4.8)
LYMPHOCYTES NFR BLD: 17.3 % (ref 22–41)
MCH RBC QN AUTO: 32.3 PG (ref 27–33)
MCHC RBC AUTO-ENTMCNC: 34.2 G/DL (ref 33.7–35.3)
MCV RBC AUTO: 94.5 FL (ref 81.4–97.8)
MONOCYTES # BLD AUTO: 0.62 K/UL (ref 0–0.85)
MONOCYTES NFR BLD AUTO: 7.1 % (ref 0–13.4)
NEUTROPHILS # BLD AUTO: 6.42 K/UL (ref 1.82–7.42)
NEUTROPHILS NFR BLD: 73.8 % (ref 44–72)
NRBC # BLD AUTO: 0 K/UL
NRBC BLD-RTO: 0 /100 WBC
PLATELET # BLD AUTO: 224 K/UL (ref 164–446)
PMV BLD AUTO: 7.8 FL (ref 9–12.9)
PROT SERPL-MCNC: 6.9 G/DL (ref 6–8.2)
RBC # BLD AUTO: 4.89 M/UL (ref 4.7–6.1)
WBC # BLD AUTO: 8.7 K/UL (ref 4.8–10.8)

## 2020-10-21 PROCEDURE — 82565 ASSAY OF CREATININE: CPT

## 2020-10-21 PROCEDURE — 80076 HEPATIC FUNCTION PANEL: CPT

## 2020-10-21 PROCEDURE — 85025 COMPLETE CBC W/AUTO DIFF WBC: CPT

## 2020-10-21 PROCEDURE — 82746 ASSAY OF FOLIC ACID SERUM: CPT

## 2020-10-21 PROCEDURE — 36415 COLL VENOUS BLD VENIPUNCTURE: CPT

## 2020-10-21 PROCEDURE — 82607 VITAMIN B-12: CPT

## 2020-10-21 PROCEDURE — 82306 VITAMIN D 25 HYDROXY: CPT

## 2020-10-22 LAB
25(OH)D3 SERPL-MCNC: 57 NG/ML (ref 30–100)
FOLATE SERPL-MCNC: 23.5 NG/ML
VIT B12 SERPL-MCNC: 560 PG/ML (ref 211–911)

## 2021-02-19 ENCOUNTER — HOSPITAL ENCOUNTER (OUTPATIENT)
Dept: LAB | Facility: MEDICAL CENTER | Age: 56
End: 2021-02-19
Attending: SPECIALIST
Payer: COMMERCIAL

## 2021-02-19 LAB
ALBUMIN SERPL BCP-MCNC: 4.4 G/DL (ref 3.2–4.9)
ALP SERPL-CCNC: 68 U/L (ref 30–99)
ALT SERPL-CCNC: 9 U/L (ref 2–50)
AST SERPL-CCNC: 17 U/L (ref 12–45)
BASOPHILS # BLD AUTO: 0.4 % (ref 0–1.8)
BASOPHILS # BLD: 0.04 K/UL (ref 0–0.12)
BILIRUB CONJ SERPL-MCNC: <0.2 MG/DL (ref 0.1–0.5)
BILIRUB INDIRECT SERPL-MCNC: NORMAL MG/DL (ref 0–1)
BILIRUB SERPL-MCNC: 0.4 MG/DL (ref 0.1–1.5)
CREAT SERPL-MCNC: 0.75 MG/DL (ref 0.5–1.4)
EOSINOPHIL # BLD AUTO: 0.04 K/UL (ref 0–0.51)
EOSINOPHIL NFR BLD: 0.4 % (ref 0–6.9)
ERYTHROCYTE [DISTWIDTH] IN BLOOD BY AUTOMATED COUNT: 45.1 FL (ref 35.9–50)
HCT VFR BLD AUTO: 48.1 % (ref 42–52)
HGB BLD-MCNC: 15.8 G/DL (ref 14–18)
IMM GRANULOCYTES # BLD AUTO: 0.05 K/UL (ref 0–0.11)
IMM GRANULOCYTES NFR BLD AUTO: 0.5 % (ref 0–0.9)
LYMPHOCYTES # BLD AUTO: 1.32 K/UL (ref 1–4.8)
LYMPHOCYTES NFR BLD: 13.6 % (ref 22–41)
MCH RBC QN AUTO: 32.2 PG (ref 27–33)
MCHC RBC AUTO-ENTMCNC: 32.8 G/DL (ref 33.7–35.3)
MCV RBC AUTO: 98 FL (ref 81.4–97.8)
MONOCYTES # BLD AUTO: 0.62 K/UL (ref 0–0.85)
MONOCYTES NFR BLD AUTO: 6.4 % (ref 0–13.4)
NEUTROPHILS # BLD AUTO: 7.62 K/UL (ref 1.82–7.42)
NEUTROPHILS NFR BLD: 78.7 % (ref 44–72)
NRBC # BLD AUTO: 0 K/UL
NRBC BLD-RTO: 0 /100 WBC
PLATELET # BLD AUTO: 287 K/UL (ref 164–446)
PMV BLD AUTO: 8.5 FL (ref 9–12.9)
PROT SERPL-MCNC: 6.9 G/DL (ref 6–8.2)
RBC # BLD AUTO: 4.91 M/UL (ref 4.7–6.1)
WBC # BLD AUTO: 9.7 K/UL (ref 4.8–10.8)

## 2021-02-19 PROCEDURE — 82565 ASSAY OF CREATININE: CPT

## 2021-02-19 PROCEDURE — 80076 HEPATIC FUNCTION PANEL: CPT

## 2021-02-19 PROCEDURE — 36415 COLL VENOUS BLD VENIPUNCTURE: CPT

## 2021-02-19 PROCEDURE — 85025 COMPLETE CBC W/AUTO DIFF WBC: CPT

## 2021-11-04 ENCOUNTER — HOSPITAL ENCOUNTER (OUTPATIENT)
Dept: LAB | Facility: MEDICAL CENTER | Age: 56
End: 2021-11-04
Attending: SPECIALIST
Payer: COMMERCIAL

## 2021-11-04 LAB
ALBUMIN SERPL BCP-MCNC: 4.4 G/DL (ref 3.2–4.9)
ALP SERPL-CCNC: 68 U/L (ref 30–99)
ALT SERPL-CCNC: 16 U/L (ref 2–50)
AST SERPL-CCNC: 11 U/L (ref 12–45)
BASOPHILS # BLD AUTO: 0.4 % (ref 0–1.8)
BASOPHILS # BLD: 0.03 K/UL (ref 0–0.12)
BILIRUB CONJ SERPL-MCNC: <0.2 MG/DL (ref 0.1–0.5)
BILIRUB INDIRECT SERPL-MCNC: ABNORMAL MG/DL (ref 0–1)
BILIRUB SERPL-MCNC: 0.7 MG/DL (ref 0.1–1.5)
EOSINOPHIL # BLD AUTO: 0.11 K/UL (ref 0–0.51)
EOSINOPHIL NFR BLD: 1.6 % (ref 0–6.9)
ERYTHROCYTE [DISTWIDTH] IN BLOOD BY AUTOMATED COUNT: 44.7 FL (ref 35.9–50)
HCT VFR BLD AUTO: 48.4 % (ref 42–52)
HGB BLD-MCNC: 17 G/DL (ref 14–18)
IMM GRANULOCYTES # BLD AUTO: 0.04 K/UL (ref 0–0.11)
IMM GRANULOCYTES NFR BLD AUTO: 0.6 % (ref 0–0.9)
LYMPHOCYTES # BLD AUTO: 0.93 K/UL (ref 1–4.8)
LYMPHOCYTES NFR BLD: 13.7 % (ref 22–41)
MCH RBC QN AUTO: 34.6 PG (ref 27–33)
MCHC RBC AUTO-ENTMCNC: 35.1 G/DL (ref 33.7–35.3)
MCV RBC AUTO: 98.4 FL (ref 81.4–97.8)
MONOCYTES # BLD AUTO: 0.69 K/UL (ref 0–0.85)
MONOCYTES NFR BLD AUTO: 10.1 % (ref 0–13.4)
NEUTROPHILS # BLD AUTO: 5 K/UL (ref 1.82–7.42)
NEUTROPHILS NFR BLD: 73.6 % (ref 44–72)
NRBC # BLD AUTO: 0 K/UL
NRBC BLD-RTO: 0 /100 WBC
PLATELET # BLD AUTO: 264 K/UL (ref 164–446)
PMV BLD AUTO: 8.2 FL (ref 9–12.9)
PROT SERPL-MCNC: 7.4 G/DL (ref 6–8.2)
RBC # BLD AUTO: 4.92 M/UL (ref 4.7–6.1)
WBC # BLD AUTO: 6.8 K/UL (ref 4.8–10.8)

## 2021-11-04 PROCEDURE — 80076 HEPATIC FUNCTION PANEL: CPT

## 2021-11-04 PROCEDURE — 36415 COLL VENOUS BLD VENIPUNCTURE: CPT

## 2021-11-04 PROCEDURE — 85025 COMPLETE CBC W/AUTO DIFF WBC: CPT

## 2021-11-17 ENCOUNTER — OFFICE VISIT (OUTPATIENT)
Dept: MEDICAL GROUP | Facility: MEDICAL CENTER | Age: 56
End: 2021-11-17
Payer: COMMERCIAL

## 2021-11-17 VITALS
WEIGHT: 188.49 LBS | DIASTOLIC BLOOD PRESSURE: 76 MMHG | RESPIRATION RATE: 14 BRPM | HEART RATE: 76 BPM | BODY MASS INDEX: 23.44 KG/M2 | TEMPERATURE: 97.9 F | OXYGEN SATURATION: 98 % | SYSTOLIC BLOOD PRESSURE: 152 MMHG | HEIGHT: 75 IN

## 2021-11-17 DIAGNOSIS — Z00.00 LABORATORY EXAMINATION ORDERED AS PART OF A ROUTINE GENERAL MEDICAL EXAMINATION: ICD-10-CM

## 2021-11-17 DIAGNOSIS — Z00.00 ROUTINE GENERAL MEDICAL EXAMINATION AT A HEALTH CARE FACILITY: ICD-10-CM

## 2021-11-17 DIAGNOSIS — Z91.89 OTHER SPECIFIED PERSONAL RISK FACTORS, NOT ELSEWHERE CLASSIFIED: ICD-10-CM

## 2021-11-17 PROCEDURE — 99396 PREV VISIT EST AGE 40-64: CPT | Performed by: FAMILY MEDICINE

## 2021-11-17 RX ORDER — UPADACITINIB 15 MG/1
TABLET, EXTENDED RELEASE ORAL
COMMUNITY
Start: 2021-11-09

## 2021-11-17 ASSESSMENT — ENCOUNTER SYMPTOMS
VOMITING: 0
DOUBLE VISION: 0
CHILLS: 0
COUGH: 0
BLURRED VISION: 0
HEADACHES: 0
NECK PAIN: 0
WEIGHT LOSS: 0
ORTHOPNEA: 0
SPEECH CHANGE: 0
PALPITATIONS: 0
SORE THROAT: 0
INSOMNIA: 0
ABDOMINAL PAIN: 0
BLOOD IN STOOL: 0
NAUSEA: 0
DIZZINESS: 0
DIARRHEA: 0
BACK PAIN: 0
MYALGIAS: 1
NERVOUS/ANXIOUS: 0
HALLUCINATIONS: 0
BRUISES/BLEEDS EASILY: 0
HEARTBURN: 0
SPUTUM PRODUCTION: 0
MEMORY LOSS: 0
SHORTNESS OF BREATH: 0
FEVER: 0
DEPRESSION: 0
SENSORY CHANGE: 0

## 2021-11-17 ASSESSMENT — LIFESTYLE VARIABLES: SUBSTANCE_ABUSE: 0

## 2021-11-17 ASSESSMENT — PATIENT HEALTH QUESTIONNAIRE - PHQ9: CLINICAL INTERPRETATION OF PHQ2 SCORE: 0

## 2021-11-17 ASSESSMENT — FIBROSIS 4 INDEX: FIB4 SCORE: .5625

## 2021-11-17 NOTE — PROGRESS NOTES
Subjective     Jadiel Buenrostro is a 56 y.o. male who presents with Annual Exam        He is here for his annual medical examination    HPI     has a past medical history of Arthritis, Carotid artery stenosis, Dyslipidemia, Obesity, Class I, BMI 30-34.9, Rheumatoid arthritis(714.0), Steatohepatitis, and Vitamin D deficiency disease.   has a past surgical history that includes wrist orif (Right, 1976); wrist orif (Right, 2003); mandible reduction closed (1985); shoulder arthroscopy w/ rotator cuff repair (Left, 1/10/2019); shoulder decompression arthroscopic (Left, 1/10/2019); and clavicle distal excision (Left, 1/10/2019).  family history includes Heart Disease in his maternal grandfather; Non-contributory in his father.   reports that he quit smoking about 3 months ago. His smoking use included cigarettes. He has never used smokeless tobacco. He reports current alcohol use of about 3.6 oz of alcohol per week. He reports that he does not use drugs.     Continues treatment for rheumatoid with rheumatology      Current Outpatient Medications:   •  RINVOQ 15 MG TABLET SR 24 HR, , Disp: , Rfl:   •  aspirin 81 MG EC tablet, Take 1 Tab by mouth every day., Disp: 90 Tab, Rfl: 3  •  ergocalciferol (DRISDOL) 89746 UNIT capsule, TAKE 1 CAPSULE BY MOUTH ONE TIME PER WEEK, Disp: , Rfl: 0  •  ibuprofen (MOTRIN) 200 MG Tab, Take 800-1,200 mg by mouth every 6 hours as needed., Disp: , Rfl:   •  acetaminophen (TYLENOL) 325 MG Tab, Take 650 mg by mouth every four hours as needed., Disp: , Rfl:   is allergic to chantix [varenicline].    Review of Systems   Constitutional: Negative for chills, fever, malaise/fatigue and weight loss.   HENT: Negative for congestion, hearing loss, sore throat and tinnitus.    Eyes: Negative for blurred vision and double vision.   Respiratory: Negative for cough, sputum production and shortness of breath.    Cardiovascular: Negative for chest pain, palpitations, orthopnea and leg swelling.  "  Gastrointestinal: Negative for abdominal pain, blood in stool, diarrhea, heartburn, nausea and vomiting.   Genitourinary: Negative for dysuria, frequency and urgency.   Musculoskeletal: Positive for joint pain and myalgias. Negative for back pain and neck pain.        Chronic joint pain   Skin: Negative for rash.   Neurological: Negative for dizziness, sensory change, speech change and headaches.   Endo/Heme/Allergies: Negative for environmental allergies. Does not bruise/bleed easily.   Psychiatric/Behavioral: Negative for depression, hallucinations, memory loss, substance abuse and suicidal ideas. The patient is not nervous/anxious and does not have insomnia.      Discussed COVID vaccine pros and cons.  Recommended Pfizer       Objective     /76 (BP Location: Right arm, Patient Position: Sitting, BP Cuff Size: Adult)   Pulse 76   Temp 36.6 °C (97.9 °F) (Temporal)   Resp 14   Ht 1.899 m (6' 2.76\")   Wt 85.5 kg (188 lb 7.9 oz)   SpO2 98%   BMI 23.71 kg/m²      Physical Exam  Vitals reviewed.   Constitutional:       Appearance: He is well-developed.   HENT:      Head: Normocephalic and atraumatic.   Eyes:      General:         Left eye: No discharge.      Pupils: Pupils are equal, round, and reactive to light.   Neck:      Thyroid: No thyromegaly.      Vascular: No JVD.   Cardiovascular:      Rate and Rhythm: Normal rate and regular rhythm.      Heart sounds: Normal heart sounds. No murmur heard.      Pulmonary:      Effort: Pulmonary effort is normal. No respiratory distress.      Breath sounds: Normal breath sounds. No wheezing or rales.   Abdominal:      General: Bowel sounds are normal. There is no distension.      Palpations: Abdomen is soft. There is no mass.      Tenderness: There is no abdominal tenderness.   Musculoskeletal:         General: Deformity present. Normal range of motion.      Cervical back: Normal range of motion and neck supple.      Comments: Joint distortion fingers  "   Lymphadenopathy:      Cervical: No cervical adenopathy.   Skin:     General: Skin is warm and dry.      Findings: No erythema or rash.   Neurological:      Mental Status: He is alert and oriented to person, place, and time.      Coordination: Coordination normal.   Psychiatric:         Behavior: Behavior normal.         lab results from 11/4/2021 CBC and hepatic panel normal      Assessment & Plan        1. Routine general medical examination at a health care facility  He is medically stable.  He is generally doing well.    2. Laboratory examination ordered as part of a routine general medical examination  Routine lab orders discussed and placed  - Lipid Profile; Future  - TSH; Future  - Comp Metabolic Panel; Future  - CBC WITHOUT DIFFERENTIAL; Future    3. Other specified personal risk factors, not elsewhere classified  Discussed, CT order placed  - CT-CARDIAC SCORING; Future         recheck 1 year, sooner as needed

## 2022-05-04 ENCOUNTER — OFFICE VISIT (OUTPATIENT)
Dept: MEDICAL GROUP | Facility: MEDICAL CENTER | Age: 57
End: 2022-05-04
Payer: COMMERCIAL

## 2022-05-04 VITALS
HEART RATE: 74 BPM | BODY MASS INDEX: 24.12 KG/M2 | RESPIRATION RATE: 12 BRPM | TEMPERATURE: 98.7 F | DIASTOLIC BLOOD PRESSURE: 84 MMHG | WEIGHT: 194 LBS | OXYGEN SATURATION: 98 % | SYSTOLIC BLOOD PRESSURE: 166 MMHG | HEIGHT: 75 IN

## 2022-05-04 DIAGNOSIS — I10 ESSENTIAL HYPERTENSION: ICD-10-CM

## 2022-05-04 PROCEDURE — 99213 OFFICE O/P EST LOW 20 MIN: CPT | Performed by: FAMILY MEDICINE

## 2022-05-04 RX ORDER — LISINOPRIL 5 MG/1
5 TABLET ORAL DAILY
Qty: 90 TABLET | Refills: 3 | Status: SHIPPED | OUTPATIENT
Start: 2022-05-04 | End: 2023-03-22

## 2022-05-04 ASSESSMENT — PATIENT HEALTH QUESTIONNAIRE - PHQ9: CLINICAL INTERPRETATION OF PHQ2 SCORE: 0

## 2022-05-04 ASSESSMENT — FIBROSIS 4 INDEX: FIB4 SCORE: 0.57

## 2022-05-04 NOTE — PROGRESS NOTES
Chief Complaint   Patient presents with   • Blood Pressure Problem     FV       Subjective:     HPI:   Jadiel Buenrostro presents today with the followin. Essential hypertension  HTN - Chronic condition stable. Currently on no medication.   He is taking baby aspirin daily.   He is monitoring BP at home. Has been 140-150 at home, staying high.  Denies symptoms low BP: light-headed, tunnel-vision, unusual fatigue.   Denies symptoms high BP:pounding headache, visual changes, palpitations, flushed face.   Denies medicine side effects: unusual fatigue, slow heartbeat, foot/leg swelling, cough.  Starting lisinopril 5 mg per day.      Patient Active Problem List    Diagnosis Date Noted   • Essential hypertension 2022   • Rotator cuff syndrome of left shoulder and allied disorders 2018   • Obesity, Class I, BMI 30-34.9    • Carotid artery stenosis    • Tobacco dependence 2015   • Coronary artery calcification seen on CAT scan 2015   • Steatohepatitis    • Vitamin D deficiency disease    • Rheumatoid arthritis involving multiple joints (CMS-[facility])    • Dyslipidemia, goal LDL below 130        Current medicines (including changes today)  Current Outpatient Medications   Medication Sig Dispense Refill   • lisinopril (PRINIVIL) 5 MG Tab Take 1 Tablet by mouth every day. 90 Tablet 3   • RINVOQ 15 MG TABLET SR 24 HR      • aspirin 81 MG EC tablet Take 1 Tab by mouth every day. 90 Tab 3   • ergocalciferol (DRISDOL) 03153 UNIT capsule TAKE 1 CAPSULE BY MOUTH ONE TIME PER WEEK  0   • ibuprofen (MOTRIN) 200 MG Tab Take 800-1,200 mg by mouth every 6 hours as needed.     • acetaminophen (TYLENOL) 325 MG Tab Take 650 mg by mouth every four hours as needed.       No current facility-administered medications for this visit.       Allergies   Allergen Reactions   • Chantix [Varenicline] Nausea       ROS: As per HPI       Objective:     BP (!) 166/84 (BP Location: Left arm, Patient Position: Sitting, BP Cuff  "Size: Adult)   Pulse 74   Temp 37.1 °C (98.7 °F) (Temporal)   Resp 12   Ht 1.899 m (6' 2.76\")   Wt 88 kg (194 lb 0.1 oz)   SpO2 98%  Body mass index is 24.4 kg/m².    Physical Exam:  Constitutional: Well-developed and well-nourished. Not diaphoretic. No distress. Lucid and fluent.  Patient, physician and staff all wearing masks.  Skin: Skin is warm and dry. No rash noted.  Head: Atraumatic without lesions.  Eyes: Conjunctivae and extraocular motions are normal. Pupils are equal, round, and reactive to light. No scleral icterus.   Ears:  External ears unremarkable.   Neck: Supple, trachea midline. No thyromegaly present. No cervical or supraclavicular lymphadenopathy. No JVD or carotid bruits appreciated  Cardiovascular: Regular rate and rhythm.  Normal S1, S2 without murmur appreciated.  Chest: Effort normal. Clear to auscultation throughout. No adventitious sounds.   Extremities: No cyanosis, clubbing, erythema, nor edema.   Neurological: Alert and oriented x 3. No tremor appreciated.  Psychiatric:  Behavior, mood, and affect are appropriate.       Assessment and Plan:     57 y.o. male with the following issues:    1. Essential hypertension  lisinopril (PRINIVIL) 5 MG Tab     He will get his lab work done    Followup: Return in about 6 months (around 11/4/2022), or if symptoms worsen or fail to improve.  "

## 2022-10-05 ENCOUNTER — TELEPHONE (OUTPATIENT)
Dept: MEDICAL GROUP | Facility: MEDICAL CENTER | Age: 57
End: 2022-10-05
Payer: COMMERCIAL

## 2022-10-05 ENCOUNTER — TELEPHONE (OUTPATIENT)
Dept: MEDICAL GROUP | Facility: MEDICAL CENTER | Age: 57
End: 2022-10-05

## 2022-10-05 DIAGNOSIS — M06.9 RHEUMATOID ARTHRITIS INVOLVING MULTIPLE JOINTS (HCC): ICD-10-CM

## 2022-10-05 NOTE — TELEPHONE ENCOUNTER
VOICEMAIL  1. Caller Name: Lia                     Call Back Number: [phone]    2. Message: Hello this message is for Dr Mo my name is Lia and I'm calling from Dr. Alcides Villagran  office for patient Jadiel Buenrostro, we need a referral for rheumatology with Dr. Villagran for 6 office visits, so can you please fax that to [phone]    Thank you

## 2022-10-05 NOTE — PROGRESS NOTES
Referral to Dr. Alexis has been requested by that office.  Patient has been following with Dr. Villagran for treatment of his rheumatoid arthritis for several years.  Referral is placed.

## 2023-03-22 ENCOUNTER — OFFICE VISIT (OUTPATIENT)
Dept: MEDICAL GROUP | Facility: MEDICAL CENTER | Age: 58
End: 2023-03-22
Payer: COMMERCIAL

## 2023-03-22 VITALS
RESPIRATION RATE: 14 BRPM | BODY MASS INDEX: 22.53 KG/M2 | HEIGHT: 75 IN | WEIGHT: 181.22 LBS | SYSTOLIC BLOOD PRESSURE: 162 MMHG | HEART RATE: 73 BPM | DIASTOLIC BLOOD PRESSURE: 82 MMHG | OXYGEN SATURATION: 98 % | TEMPERATURE: 97.9 F

## 2023-03-22 DIAGNOSIS — M70.21 OLECRANON BURSITIS OF RIGHT ELBOW: ICD-10-CM

## 2023-03-22 DIAGNOSIS — E78.5 DYSLIPIDEMIA, GOAL LDL BELOW 130: ICD-10-CM

## 2023-03-22 DIAGNOSIS — I65.22 STENOSIS OF LEFT CAROTID ARTERY: ICD-10-CM

## 2023-03-22 DIAGNOSIS — Z12.5 PROSTATE CANCER SCREENING: ICD-10-CM

## 2023-03-22 DIAGNOSIS — I10 ESSENTIAL HYPERTENSION: ICD-10-CM

## 2023-03-22 DIAGNOSIS — Z98.890 STATUS POST SHOULDER SURGERY: ICD-10-CM

## 2023-03-22 DIAGNOSIS — R00.2 HEART PALPITATIONS: ICD-10-CM

## 2023-03-22 DIAGNOSIS — M06.321 RHEUMATOID NODULE OF ELBOW, RIGHT (HCC): ICD-10-CM

## 2023-03-22 DIAGNOSIS — M06.9 RHEUMATOID ARTHRITIS INVOLVING MULTIPLE JOINTS (HCC): ICD-10-CM

## 2023-03-22 DIAGNOSIS — L98.9 LESION OF SKIN OF SCALP: ICD-10-CM

## 2023-03-22 DIAGNOSIS — E55.9 VITAMIN D DEFICIENCY: ICD-10-CM

## 2023-03-22 DIAGNOSIS — I25.10 CORONARY ARTERY CALCIFICATION SEEN ON CAT SCAN: ICD-10-CM

## 2023-03-22 PROCEDURE — 99214 OFFICE O/P EST MOD 30 MIN: CPT | Performed by: FAMILY MEDICINE

## 2023-03-22 RX ORDER — MULTIVIT WITH MINERALS/LUTEIN
1 TABLET ORAL
COMMUNITY

## 2023-03-22 RX ORDER — THIAMINE HCL 100 MG
500 TABLET ORAL
COMMUNITY

## 2023-03-22 RX ORDER — VITAMIN B COMPLEX
TABLET ORAL
COMMUNITY

## 2023-03-22 RX ORDER — LISINOPRIL 10 MG/1
10 TABLET ORAL DAILY
Qty: 90 TABLET | Refills: 3 | Status: SHIPPED | OUTPATIENT
Start: 2023-03-22 | End: 2023-04-17

## 2023-03-22 ASSESSMENT — PATIENT HEALTH QUESTIONNAIRE - PHQ9
5. POOR APPETITE OR OVEREATING: 1 - SEVERAL DAYS
CLINICAL INTERPRETATION OF PHQ2 SCORE: 1
SUM OF ALL RESPONSES TO PHQ QUESTIONS 1-9: 4

## 2023-03-22 ASSESSMENT — FIBROSIS 4 INDEX: FIB4 SCORE: 0.57

## 2023-03-22 NOTE — PROGRESS NOTES
Chief Complaint   Patient presents with    Hypertension Follow-up     6m fv       Subjective:     HPI:   Jadiel Buenrostro presents today with the followin. Essential hypertension  HTN - Chronic condition stable. Currently taking all meds as directed.   He is taking baby aspirin daily.   He is monitoring BP at home.  Apparently he checks sporadically.  Has been in the high in the 150s and 160s.  He is currently on lisinopril 5 mg.  I have made a small increase to 10 mg but that may not be adequate.  Denies symptoms low BP: light-headed, tunnel-vision, unusual fatigue.   Denies symptoms high BP:pounding headache, visual changes, palpitations, flushed face.   Denies medicine side effects: unusual fatigue, slow heartbeat, foot/leg swelling, cough.  Follow-up lab orders discussed and placed.    2. Heart palpitations  He is getting more frequent heart palpitations.  He believes his rheumatologist has made a referral to cardiology.  Lab orders are discussed and placed.    3. Coronary artery calcification seen on CAT scan/Dyslipidemia, goal LDL below 130  Patient denies chest pain, chest pressure, palpitations or exertional shortness of breath. Patient is not on lipid-lowering medication.  His last lipid test in 2018 was significantly high.  However, I am having him repeat as this is several years ago. Patient is a former smoker. Patient takes 81 mg aspirin daily. Patient has no history of myocardial infarction or stroke.  He does have increased coronary artery calcifications on CT and left carotid mild to moderate stenosis..  Lab orders discussed and placed.    4. Stenosis of left carotid artery  Stenosis per [facility] screening 2017.  Needs follow-up.  He is having some visual changes though not clearly amaurosis.    5. Rheumatoid arthritis involving multiple joints (CMS-[facility])  Patient has pain and joint distortion from rheumatoid arthritis.  He is on Rinvoq which she feels is not working quite as well as his  previous but is of course much more convenient orally.  He follows carefully with rheumatology.    6. Vitamin D deficiency disease  He does have deficiency.  He is taking supplementation.  Follow-up lab order discussed and placed.    7. Lesion of skin of scalp  3 cm lesion top of scalp which is erythematous.  There is no scale appreciated.  There is no ulceration appreciated.  There are definite telangiectasias.  Patient has been using a Neosporin type ointment on this area.  He may have actually developed an allergic response.  It is unclear to me what this lesion is.  Referral to dermatology is discussed and placed.    8. Status post shoulder surgery  He had successful surgery in 2019.  This was helpful.  He continues to have some stiffness and pain.    9. Prostate cancer screening  Screening discussed.  Order placed.    10. Olecranon bursitis of right elbow/Rheumatoid nodule of elbow, right (HCC)  He has quite enlarged rheumatoid nodules on the right elbow in the region of the olecranon bursa.  I would say this is about 5 cm x 4 cm.  Referral to orthopedist discussed and placed.  - Referral to Orthopedics      Patient Active Problem List    Diagnosis Date Noted    Status post shoulder surgery 03/22/2023    Heart palpitations 03/22/2023    Prostate cancer screening 03/22/2023    Lesion of skin of scalp 03/22/2023    Essential hypertension 05/04/2022    Carotid artery stenosis     Tobacco dependence 07/17/2015    Coronary artery calcification seen on CAT scan 07/17/2015    Steatohepatitis     Vitamin D deficiency disease     Rheumatoid arthritis involving multiple joints (CMS-HCC)     Dyslipidemia, goal LDL below 130        Current medicines (including changes today)  Current Outpatient Medications   Medication Sig Dispense Refill    lisinopril (PRINIVIL) 10 MG Tab Take 1 Tablet by mouth every day. 90 Tablet 3    Magnesium 500 MG Tab Take 500 mg by mouth at bedtime.      Ascorbic Acid (VITAMIN C) 1000 MG Tab Take 1  "Tablet by mouth every day.      vitamin D3 (CHOLECALCIFEROL) 1000 Unit (25 mcg) Tab 3  tablets Orally Once a day      RINVOQ 15 MG TABLET SR 24 HR       aspirin 81 MG EC tablet Take 1 Tab by mouth every day. 90 Tab 3    ibuprofen (MOTRIN) 200 MG Tab Take 800-1,200 mg by mouth every 6 hours as needed.      acetaminophen (TYLENOL) 325 MG Tab Take 650 mg by mouth every four hours as needed.       No current facility-administered medications for this visit.       Allergies   Allergen Reactions    Chantix [Varenicline] Nausea       ROS: As per HPI       Objective:     BP (!) 162/82 (BP Location: Right arm, Patient Position: Sitting, BP Cuff Size: Small adult)   Pulse 73   Temp 36.6 °C (97.9 °F) (Temporal)   Resp 14   Ht 1.899 m (6' 2.76\")   Wt 82.2 kg (181 lb 3.5 oz)   SpO2 98%  Body mass index is 22.8 kg/m².    Physical Exam:  Constitutional: Well-developed and well-nourished. Not diaphoretic. No distress. Lucid and fluent. Patient, physician and staff all wearing masks.  Skin: Skin is warm and dry.  There is an erythematous region on the top of the scalp which is about 3 cm in diameter.  Edges not clearly demarcated.  There is no plaque formation.  There is no significant thickening.  No ulcerations are present.  Telangiectasias are present.  Head: Atraumatic without lesions.  Eyes: Conjunctivae and extraocular motions are normal. Pupils are equal, round, and reactive to light. No scleral icterus.   Ears:  External ears unremarkable.   Neck: Range of motion is preserved though a little stiff. No thyromegaly present. No cervical or supraclavicular lymphadenopathy. No JVD or carotid bruits appreciated  Cardiovascular: Regular rate and rhythm.  Normal S1, S2 without murmur appreciated.  Chest: Effort normal. Clear to auscultation throughout. No adventitious sounds.   Abdomen: Soft, non tender, and without distention. Active bowel sounds in all four quadrants. No rebound, guarding, masses or " hepatosplenomegaly.  Extremities: No cyanosis, clubbing, erythema, nor edema. Enlarged olecrananon soft tissue on right elbow.  Marked joint distortion of the hands particularly MCP joints.  Neurological: Alert and oriented x 3.  No current tremor appreciated.  Psychiatric:  Behavior, mood, and affect are appropriate.       Assessment and Plan:     57 y.o. male with the following issues:    1. Essential hypertension  lisinopril (PRINIVIL) 10 MG Tab    Lipid Profile    TSH    Comp Metabolic Panel      2. Heart palpitations  TSH    CBC WITHOUT DIFFERENTIAL      3. Coronary artery calcification seen on CAT scan        4. Dyslipidemia, goal LDL below 130  Lipid Profile    Comp Metabolic Panel      5. Stenosis of left carotid artery  US-CAROTID DOPPLER BILAT      6. Rheumatoid arthritis involving multiple joints (CMS-HCC)        7. Vitamin D deficiency disease  VITAMIN D,25 HYDROXY (DEFICIENCY)      8. Lesion of skin of scalp  Referral to Dermatology      9. Status post shoulder surgery        10. Prostate cancer screening  PROSTATE SPECIFIC AG SCREENING      11. Olecranon bursitis of right elbow  Referral to Orthopedics      12. Rheumatoid nodule of elbow, right (HCC)  Referral to Orthopedics            Followup: Return in about 6 months (around 9/22/2023), or if symptoms worsen or fail to improve.

## 2023-04-17 PROBLEM — M70.20 OLECRANON BURSITIS: Status: ACTIVE | Noted: 2023-04-17

## 2023-05-05 ENCOUNTER — HOSPITAL ENCOUNTER (OUTPATIENT)
Dept: RADIOLOGY | Facility: MEDICAL CENTER | Age: 58
End: 2023-05-05
Attending: FAMILY MEDICINE
Payer: COMMERCIAL

## 2023-05-05 DIAGNOSIS — I65.22 STENOSIS OF LEFT CAROTID ARTERY: ICD-10-CM

## 2023-05-05 PROCEDURE — 93880 EXTRACRANIAL BILAT STUDY: CPT

## 2023-06-15 ENCOUNTER — HOSPITAL ENCOUNTER (OUTPATIENT)
Dept: LAB | Facility: MEDICAL CENTER | Age: 58
End: 2023-06-15
Attending: ALLERGY & IMMUNOLOGY
Payer: COMMERCIAL

## 2023-06-15 ENCOUNTER — HOSPITAL ENCOUNTER (OUTPATIENT)
Dept: LAB | Facility: MEDICAL CENTER | Age: 58
End: 2023-06-15
Attending: SPECIALIST
Payer: COMMERCIAL

## 2023-06-15 LAB
ALBUMIN SERPL BCP-MCNC: 4.6 G/DL (ref 3.2–4.9)
ALBUMIN/GLOB SERPL: 1.6 G/DL
ALP SERPL-CCNC: 77 U/L (ref 30–99)
ALT SERPL-CCNC: 25 U/L (ref 2–50)
ANION GAP SERPL CALC-SCNC: 13 MMOL/L (ref 7–16)
AST SERPL-CCNC: 25 U/L (ref 12–45)
BASOPHILS # BLD AUTO: 0.6 % (ref 0–1.8)
BASOPHILS # BLD: 0.03 K/UL (ref 0–0.12)
BILIRUB SERPL-MCNC: 0.6 MG/DL (ref 0.1–1.5)
BUN SERPL-MCNC: 5 MG/DL (ref 8–22)
CALCIUM ALBUM COR SERPL-MCNC: 8.7 MG/DL (ref 8.5–10.5)
CALCIUM SERPL-MCNC: 9.2 MG/DL (ref 8.4–10.2)
CHLORIDE SERPL-SCNC: 91 MMOL/L (ref 96–112)
CO2 SERPL-SCNC: 23 MMOL/L (ref 20–33)
CREAT SERPL-MCNC: 0.63 MG/DL (ref 0.5–1.4)
CRP SERPL HS-MCNC: <0.3 MG/DL (ref 0–0.75)
EOSINOPHIL # BLD AUTO: 0.19 K/UL (ref 0–0.51)
EOSINOPHIL NFR BLD: 3.9 % (ref 0–6.9)
ERYTHROCYTE [DISTWIDTH] IN BLOOD BY AUTOMATED COUNT: 43.5 FL (ref 35.9–50)
ERYTHROCYTE [SEDIMENTATION RATE] IN BLOOD BY WESTERGREN METHOD: 0 MM/HOUR (ref 0–20)
GFR SERPLBLD CREATININE-BSD FMLA CKD-EPI: 111 ML/MIN/1.73 M 2
GLOBULIN SER CALC-MCNC: 2.8 G/DL (ref 1.9–3.5)
GLUCOSE SERPL-MCNC: 84 MG/DL (ref 65–99)
HCT VFR BLD AUTO: 46 % (ref 42–52)
HGB BLD-MCNC: 16.2 G/DL (ref 14–18)
IMM GRANULOCYTES # BLD AUTO: 0.02 K/UL (ref 0–0.11)
IMM GRANULOCYTES NFR BLD AUTO: 0.4 % (ref 0–0.9)
LYMPHOCYTES # BLD AUTO: 1.52 K/UL (ref 1–4.8)
LYMPHOCYTES NFR BLD: 31.2 % (ref 22–41)
MCH RBC QN AUTO: 34.8 PG (ref 27–33)
MCHC RBC AUTO-ENTMCNC: 35.2 G/DL (ref 32.3–36.5)
MCV RBC AUTO: 98.7 FL (ref 81.4–97.8)
MONOCYTES # BLD AUTO: 0.55 K/UL (ref 0–0.85)
MONOCYTES NFR BLD AUTO: 11.3 % (ref 0–13.4)
NEUTROPHILS # BLD AUTO: 2.56 K/UL (ref 1.82–7.42)
NEUTROPHILS NFR BLD: 52.6 % (ref 44–72)
NRBC # BLD AUTO: 0 K/UL
NRBC BLD-RTO: 0 /100 WBC (ref 0–0.2)
PLATELET # BLD AUTO: 223 K/UL (ref 164–446)
PMV BLD AUTO: 7.4 FL (ref 9–12.9)
POTASSIUM SERPL-SCNC: 4.8 MMOL/L (ref 3.6–5.5)
PROT SERPL-MCNC: 7.4 G/DL (ref 6–8.2)
RBC # BLD AUTO: 4.66 M/UL (ref 4.7–6.1)
SODIUM SERPL-SCNC: 127 MMOL/L (ref 135–145)
WBC # BLD AUTO: 4.9 K/UL (ref 4.8–10.8)

## 2023-06-15 PROCEDURE — 86140 C-REACTIVE PROTEIN: CPT

## 2023-06-15 PROCEDURE — 85025 COMPLETE CBC W/AUTO DIFF WBC: CPT

## 2023-06-15 PROCEDURE — 36415 COLL VENOUS BLD VENIPUNCTURE: CPT

## 2023-06-15 PROCEDURE — 80053 COMPREHEN METABOLIC PANEL: CPT

## 2023-06-15 PROCEDURE — 85652 RBC SED RATE AUTOMATED: CPT

## 2023-11-07 ENCOUNTER — TELEPHONE (OUTPATIENT)
Dept: MEDICAL GROUP | Facility: MEDICAL CENTER | Age: 58
End: 2023-11-07

## 2023-11-07 DIAGNOSIS — M06.9 RHEUMATOID ARTHRITIS INVOLVING MULTIPLE JOINTS (HCC): ICD-10-CM

## 2023-11-07 NOTE — TELEPHONE ENCOUNTER
Caller Name: Dr Villagran Office   Call Back Number: [phone]    How would the patient prefer to be contacted with a response: Phone call OK to leave a detailed message    2. SPECIFIC Action To Be Taken:  NONE     3. Diagnosis/Clinical Reason for Request: arthritis     4. Specialty & Provider Name/Lab/Imaging Location: Dr. Alcides Villagran MD, [facility]     5. Is appointment scheduled for requested order/referral: yes - Thursday 11/9/23

## 2024-07-09 RX ORDER — LISINOPRIL 10 MG/1
10 TABLET ORAL
Qty: 90 TABLET | Refills: 1 | Status: SHIPPED | OUTPATIENT
Start: 2024-07-09

## 2024-08-30 ENCOUNTER — OFFICE VISIT (OUTPATIENT)
Dept: MEDICAL GROUP | Facility: MEDICAL CENTER | Age: 59
End: 2024-08-30
Payer: COMMERCIAL

## 2024-08-30 VITALS
OXYGEN SATURATION: 96 % | HEART RATE: 68 BPM | HEIGHT: 75 IN | DIASTOLIC BLOOD PRESSURE: 76 MMHG | WEIGHT: 196.87 LBS | TEMPERATURE: 98.1 F | RESPIRATION RATE: 16 BRPM | BODY MASS INDEX: 24.48 KG/M2 | SYSTOLIC BLOOD PRESSURE: 128 MMHG

## 2024-08-30 DIAGNOSIS — I10 ESSENTIAL HYPERTENSION: ICD-10-CM

## 2024-08-30 DIAGNOSIS — E55.9 VITAMIN D DEFICIENCY: ICD-10-CM

## 2024-08-30 DIAGNOSIS — Z12.5 PROSTATE CANCER SCREENING: ICD-10-CM

## 2024-08-30 DIAGNOSIS — Z87.891 FORMER TOBACCO USE: ICD-10-CM

## 2024-08-30 DIAGNOSIS — R07.89 PRESSURE IN LEFT SIDE OF CHEST: ICD-10-CM

## 2024-08-30 DIAGNOSIS — I25.10 CORONARY ARTERY CALCIFICATION SEEN ON CAT SCAN: ICD-10-CM

## 2024-08-30 DIAGNOSIS — E78.5 DYSLIPIDEMIA, GOAL LDL BELOW 130: ICD-10-CM

## 2024-08-30 PROCEDURE — 3074F SYST BP LT 130 MM HG: CPT | Performed by: FAMILY MEDICINE

## 2024-08-30 PROCEDURE — 3078F DIAST BP <80 MM HG: CPT | Performed by: FAMILY MEDICINE

## 2024-08-30 PROCEDURE — 99214 OFFICE O/P EST MOD 30 MIN: CPT | Performed by: FAMILY MEDICINE

## 2024-08-30 RX ORDER — UPADACITINIB 15 MG/1
TABLET, EXTENDED RELEASE ORAL
COMMUNITY

## 2024-08-30 ASSESSMENT — PATIENT HEALTH QUESTIONNAIRE - PHQ9: CLINICAL INTERPRETATION OF PHQ2 SCORE: 0

## 2024-08-30 ASSESSMENT — FIBROSIS 4 INDEX: FIB4 SCORE: 1.28

## 2024-08-30 NOTE — PROGRESS NOTES
Chief Complaint   Patient presents with    Hypertension    Chest Pain       Subjective:     HPI:   Jadiel Buenrostro presents today with the followin. Pressure in left side of chest  ***    2. Dyslipidemia, goal LDL below 130  ***    3. Coronary artery calcification seen on CAT scan  ***    4. Former tobacco use  ***    5. Essential hypertension  ***    6. Vitamin D deficiency disease  ***    7. Prostate cancer screening  ***        Patient Active Problem List    Diagnosis Date Noted    Former tobacco use 2024    Pressure in left side of chest 2024    Olecranon bursitis 2023    Status post shoulder surgery 2023    Heart palpitations 2023    Prostate cancer screening 2023    Lesion of skin of scalp 2023    Essential hypertension 2022    Carotid artery stenosis     Coronary artery calcification seen on CAT scan 2015    Vitamin D deficiency disease     Rheumatoid arthritis involving multiple joints (CMS-HCC)     Dyslipidemia, goal LDL below 130        Current medicines (including changes today)  Current Outpatient Medications   Medication Sig Dispense Refill    Upadacitinib ER (RINVOQ) 15 MG TABLET SR 24 HR 1 tablet Orally Once a day for 90 days      lisinopril (PRINIVIL) 10 MG Tab TAKE 1 TABLET BY MOUTH EVERY DAY 90 Tablet 1    multivitamin Tab Take 1 Tablet by mouth every day.      Ascorbic Acid (VITAMIN C) 1000 MG Tab Take 1 Tablet by mouth every day.      vitamin D3 (CHOLECALCIFEROL) 1000 Unit (25 mcg) Tab 3  tablets Orally Once a day      RINVOQ 15 MG TABLET SR 24 HR       aspirin 81 MG EC tablet Take 1 Tab by mouth every day. 90 Tab 3    ibuprofen (MOTRIN) 200 MG Tab Take 800-1,200 mg by mouth every 6 hours as needed.      acetaminophen (TYLENOL) 325 MG Tab Take 650 mg by mouth every four hours as needed.      Magnesium 500 MG Tab Take 500 mg by mouth at bedtime. (Patient not taking: Reported on 2024)       No current facility-administered  "medications for this visit.       Allergies   Allergen Reactions    Chantix [Varenicline] Nausea       ROS: As per HPI       Objective:     /76   Pulse 68   Temp 36.7 °C (98.1 °F)   Resp 16   Ht 1.905 m (6' 3\")   Wt 89.3 kg (196 lb 13.9 oz)   SpO2 96%  Body mass index is 24.61 kg/m².    Physical Exam:  Constitutional: Well-developed and well-nourished. Not diaphoretic. No distress. Lucid and fluent.  Skin: Skin is warm and dry. No rash noted.  Head: Atraumatic without lesions.  Eyes: Conjunctivae and extraocular motions are normal. Pupils are equal, round, and reactive to light. No scleral icterus.   Ears:  External ears unremarkable. Tympanic membranes clear and intact.  Nose: Nares patent. Mucosa without edema or erythema. No discharge. No facial tenderness.  Mouth/Throat: Tongue normal. Oropharynx is clear and moist. Posterior pharynx without erythema or exudates.  Neck: Supple, trachea midline. No thyromegaly present. No cervical or supraclavicular lymphadenopathy. No JVD or carotid bruits appreciated  Cardiovascular: Regular rate and rhythm.  Normal S1, S2 without murmur appreciated.  Chest: Effort normal. Clear to auscultation throughout. No adventitious sounds.   Abdomen: Soft, non tender, and without distention. Active bowel sounds in all four quadrants. No rebound, guarding, masses or hepatosplenomegaly.  Extremities: No cyanosis, clubbing, erythema, nor edema.   Neurological: Alert and oriented x 3. DTRs 2+/3 and symmetric. No cranial nerve deficit. 5/5 myotomes. Sensation intact. Negative Rhomberg.***  Psychiatric:  Behavior, mood, and affect are appropriate.       Assessment and Plan:     59 y.o. male with the following issues:    1. Pressure in left side of chest  NM-CARDIAC STRESS TEST    CANCELED: NM-CARDIAC TREADMILL ONLY-NO IMAGING      2. Dyslipidemia, goal LDL below 130  Comp Metabolic Panel    CBC WITHOUT DIFFERENTIAL    TSH    Lipid Profile      3. Coronary artery calcification seen " on CAT scan        4. Former tobacco use        5. Essential hypertension  NM-CARDIAC STRESS TEST    Comp Metabolic Panel    CBC WITHOUT DIFFERENTIAL    Lipid Profile      6. Vitamin D deficiency disease  VITAMIN D,25 HYDROXY (DEFICIENCY)      7. Prostate cancer screening  PROSTATE SPECIFIC AG SCREENING            Followup: No follow-ups on file.

## 2024-09-26 ENCOUNTER — TELEPHONE (OUTPATIENT)
Dept: HEALTH INFORMATION MANAGEMENT | Facility: OTHER | Age: 59
End: 2024-09-26
Payer: COMMERCIAL

## 2025-02-05 PROBLEM — M70.22 OLECRANON BURSITIS, LEFT ELBOW: Status: ACTIVE | Noted: 2025-02-05

## 2025-05-22 ENCOUNTER — OFFICE VISIT (OUTPATIENT)
Dept: MEDICAL GROUP | Facility: MEDICAL CENTER | Age: 60
End: 2025-05-22
Payer: COMMERCIAL

## 2025-05-22 VITALS
HEART RATE: 65 BPM | HEIGHT: 75 IN | RESPIRATION RATE: 14 BRPM | OXYGEN SATURATION: 97 % | TEMPERATURE: 97.6 F | SYSTOLIC BLOOD PRESSURE: 140 MMHG | DIASTOLIC BLOOD PRESSURE: 66 MMHG | BODY MASS INDEX: 24.09 KG/M2 | WEIGHT: 193.78 LBS

## 2025-05-22 DIAGNOSIS — E78.5 DYSLIPIDEMIA, GOAL LDL BELOW 130: ICD-10-CM

## 2025-05-22 DIAGNOSIS — I10 ESSENTIAL HYPERTENSION: ICD-10-CM

## 2025-05-22 DIAGNOSIS — E55.9 VITAMIN D DEFICIENCY: ICD-10-CM

## 2025-05-22 DIAGNOSIS — Z12.5 PROSTATE CANCER SCREENING: ICD-10-CM

## 2025-05-22 PROCEDURE — 3078F DIAST BP <80 MM HG: CPT | Performed by: FAMILY MEDICINE

## 2025-05-22 PROCEDURE — 3077F SYST BP >= 140 MM HG: CPT | Performed by: FAMILY MEDICINE

## 2025-05-22 PROCEDURE — 99214 OFFICE O/P EST MOD 30 MIN: CPT | Performed by: FAMILY MEDICINE

## 2025-05-22 RX ORDER — LISINOPRIL 20 MG/1
20 TABLET ORAL
Qty: 100 TABLET | Refills: 3 | Status: SHIPPED | OUTPATIENT
Start: 2025-05-22

## 2025-05-22 ASSESSMENT — PATIENT HEALTH QUESTIONNAIRE - PHQ9: CLINICAL INTERPRETATION OF PHQ2 SCORE: 0

## 2025-05-22 ASSESSMENT — FIBROSIS 4 INDEX: FIB4 SCORE: 1.3

## 2025-05-22 NOTE — PROGRESS NOTES
Chief Complaint   Patient presents with    Medication Refill    Hypertension    Dyslipidemia       Subjective:     HPI:   Jadiel Buenrostro presents today with the followin. Essential hypertension  HTN - Chronic condition stable. Currently taking all meds as directed.   He is taking baby aspirin daily.   He is monitoring BP at home. He checks a few times per month.  His systolic readings have been 140-152.  Discussed, advised increasing the lisinopril to 20 mg.    Denies symptoms low BP: light-headed, tunnel-vision, unusual fatigue.   Denies symptoms high BP:pounding headache, visual changes, palpitations, flushed face.   Denies medicine side effects: unusual fatigue, slow heartbeat, foot/leg swelling, cough.  Lab orders discussed and placed.  Having increased lisinopril dose and have sent prescription to the pharmacy.    2. Dyslipidemia, goal LDL below 130  Patient denies chest pain, chest pressure, palpitations or exertional shortness of breath. Patient is not on lipid-lowering medication.  Last lipids several years ago were very high. Patient is a former smoker.  He quit nearly 4 years ago.  Patient takes no aspirin daily. Patient has no history of myocardial infarction or stroke.  Does have coronary artery calcification seen on CT and carotid artery stenosis.  Patient very strongly does not want to go on lipid-lowering medication if possible.  Lab orders discussed and placed.  Have urged patient to get these labs completed.    3. Vitamin D deficiency disease  Taking vitamin D supplement.  No history of pathologic fracture.  Follow up order discussed and placed.    4. Prostate cancer screening  Last PSA was normal but was 6 years ago.  Order discussed and placed        Patient Active Problem List    Diagnosis Date Noted    Olecranon bursitis, left elbow 2025    Former tobacco use 2024    Pressure in left side of chest 2024    Olecranon bursitis 2023    Status post shoulder  "surgery 03/22/2023    Heart palpitations 03/22/2023    Prostate cancer screening 03/22/2023    Lesion of skin of scalp 03/22/2023    Essential hypertension 05/04/2022    Carotid artery stenosis     Coronary artery calcification seen on CAT scan 07/17/2015    Vitamin D deficiency disease     Rheumatoid arthritis involving multiple joints (CMS-HCC)     Dyslipidemia, goal LDL below 130        Current medicines (including changes today)  Current Medications[1]    Allergies[2]    ROS: As per HPI       Objective:     BP (!) 140/66 (BP Location: Right arm, Patient Position: Sitting, BP Cuff Size: Adult)   Pulse 65   Temp 36.4 °C (97.6 °F) (Temporal)   Resp 14   Ht 1.905 m (6' 3\")   Wt 87.9 kg (193 lb 12.6 oz)   SpO2 97%  Body mass index is 24.22 kg/m².    Physical Exam:  Constitutional: Well-developed and well-nourished. Not diaphoretic. No distress. Lucid and fluent.  Skin: Skin is warm and dry. No rash noted.  Head: Atraumatic without lesions.  Eyes: Conjunctivae and extraocular motions are normal. Pupils are equal, round, and reactive to light. No scleral icterus.   Ears:  External ears unremarkable.   Neck: Supple, trachea midline. No thyromegaly present. No cervical or supraclavicular lymphadenopathy. No JVD or carotid bruits appreciated  Cardiovascular: Regular rate and rhythm.  Normal S1, S2 without murmur appreciated.  Chest: Effort normal. Clear to auscultation throughout. No adventitious sounds.   Abdomen: Soft, non tender, and without distention. Active bowel sounds in all four quadrants. No rebound, guarding, masses or hepatosplenomegaly.  Extremities: No cyanosis, clubbing, erythema, nor edema.  Distorted joints from his underlying autoimmune rheumatoid arthritis.  No current active synovitis evident.  Movements are fluid and strong with a good gait.  Neurological: Alert and oriented x 3.  No tremor, movements symmetric.  Psychiatric:  Behavior, mood, and affect are appropriate.       Assessment and " Plan:     60 y.o. male with the following issues:    1. Essential hypertension  Comp Metabolic Panel    CBC WITHOUT DIFFERENTIAL    Lipid Profile    lisinopril (PRINIVIL) 20 MG Tab      2. Dyslipidemia, goal LDL below 130  Comp Metabolic Panel    CBC WITHOUT DIFFERENTIAL    TSH    VITAMIN D,25 HYDROXY (DEFICIENCY)    Lipid Profile      3. Vitamin D deficiency disease  VITAMIN D,25 HYDROXY (DEFICIENCY)      4. Prostate cancer screening  PROSTATE SPECIFIC AG SCREENING            Followup: Return in about 6 months (around 11/22/2025), or if symptoms worsen or fail to improve.         [1]   Current Outpatient Medications   Medication Sig Dispense Refill    lisinopril (PRINIVIL) 20 MG Tab Take 1 Tablet by mouth every day. 100 Tablet 3    multivitamin Tab Take 1 Tablet by mouth every day.      Ascorbic Acid (VITAMIN C) 1000 MG Tab Take 1 Tablet by mouth every day.      vitamin D3 (CHOLECALCIFEROL) 1000 Unit (25 mcg) Tab 3  tablets Orally Once a day      RINVOQ 15 MG TABLET SR 24 HR       aspirin 81 MG EC tablet Take 1 Tab by mouth every day. 90 Tab 3    ibuprofen (MOTRIN) 200 MG Tab Take 800-1,200 mg by mouth every 6 hours as needed.      acetaminophen (TYLENOL) 325 MG Tab Take 650 mg by mouth every four hours as needed.       No current facility-administered medications for this visit.   [2]   Allergies  Allergen Reactions    Chantix [Varenicline] Nausea

## 2025-06-09 ENCOUNTER — APPOINTMENT (OUTPATIENT)
Dept: LAB | Facility: MEDICAL CENTER | Age: 60
End: 2025-06-09
Payer: COMMERCIAL

## 2025-06-19 ENCOUNTER — HOSPITAL ENCOUNTER (OUTPATIENT)
Dept: LAB | Facility: MEDICAL CENTER | Age: 60
End: 2025-06-19
Attending: FAMILY MEDICINE
Payer: COMMERCIAL

## 2025-06-19 DIAGNOSIS — E78.5 DYSLIPIDEMIA, GOAL LDL BELOW 130: ICD-10-CM

## 2025-06-19 DIAGNOSIS — I10 ESSENTIAL HYPERTENSION: ICD-10-CM

## 2025-06-19 DIAGNOSIS — Z12.5 PROSTATE CANCER SCREENING: ICD-10-CM

## 2025-06-19 DIAGNOSIS — E55.9 VITAMIN D DEFICIENCY: ICD-10-CM

## 2025-06-19 LAB
25(OH)D3 SERPL-MCNC: 46 NG/ML (ref 30–100)
ALBUMIN SERPL BCP-MCNC: 4.2 G/DL (ref 3.2–4.9)
ALBUMIN/GLOB SERPL: 1.6 G/DL
ALP SERPL-CCNC: 75 U/L (ref 30–99)
ALT SERPL-CCNC: 22 U/L (ref 2–50)
ANION GAP SERPL CALC-SCNC: 12 MMOL/L (ref 7–16)
AST SERPL-CCNC: 27 U/L (ref 12–45)
BILIRUB SERPL-MCNC: 0.6 MG/DL (ref 0.1–1.5)
BUN SERPL-MCNC: 2 MG/DL (ref 8–22)
CALCIUM ALBUM COR SERPL-MCNC: 8.6 MG/DL (ref 8.5–10.5)
CALCIUM SERPL-MCNC: 8.8 MG/DL (ref 8.5–10.5)
CHLORIDE SERPL-SCNC: 94 MMOL/L (ref 96–112)
CHOLEST SERPL-MCNC: 190 MG/DL (ref 100–199)
CO2 SERPL-SCNC: 23 MMOL/L (ref 20–33)
CREAT SERPL-MCNC: 0.62 MG/DL (ref 0.5–1.4)
ERYTHROCYTE [DISTWIDTH] IN BLOOD BY AUTOMATED COUNT: 45.5 FL (ref 35.9–50)
FASTING STATUS PATIENT QL REPORTED: NORMAL
GFR SERPLBLD CREATININE-BSD FMLA CKD-EPI: 111 ML/MIN/1.73 M 2
GLOBULIN SER CALC-MCNC: 2.7 G/DL (ref 1.9–3.5)
GLUCOSE SERPL-MCNC: 92 MG/DL (ref 65–99)
HCT VFR BLD AUTO: 44.9 % (ref 42–52)
HDLC SERPL-MCNC: 48 MG/DL
HGB BLD-MCNC: 15.5 G/DL (ref 14–18)
LDLC SERPL CALC-MCNC: 124 MG/DL
MCH RBC QN AUTO: 34.7 PG (ref 27–33)
MCHC RBC AUTO-ENTMCNC: 34.5 G/DL (ref 32.3–36.5)
MCV RBC AUTO: 100.4 FL (ref 81.4–97.8)
PLATELET # BLD AUTO: 257 K/UL (ref 164–446)
PMV BLD AUTO: 8 FL (ref 9–12.9)
POTASSIUM SERPL-SCNC: 5 MMOL/L (ref 3.6–5.5)
PROT SERPL-MCNC: 6.9 G/DL (ref 6–8.2)
PSA SERPL DL<=0.01 NG/ML-MCNC: 2.9 NG/ML (ref 0–4)
RBC # BLD AUTO: 4.47 M/UL (ref 4.7–6.1)
SODIUM SERPL-SCNC: 129 MMOL/L (ref 135–145)
TRIGL SERPL-MCNC: 91 MG/DL (ref 0–149)
TSH SERPL-ACNC: 1.22 UIU/ML (ref 0.38–5.33)
WBC # BLD AUTO: 4.2 K/UL (ref 4.8–10.8)

## 2025-06-19 PROCEDURE — 36415 COLL VENOUS BLD VENIPUNCTURE: CPT

## 2025-06-19 PROCEDURE — 82306 VITAMIN D 25 HYDROXY: CPT

## 2025-06-19 PROCEDURE — 80061 LIPID PANEL: CPT

## 2025-06-19 PROCEDURE — 80053 COMPREHEN METABOLIC PANEL: CPT

## 2025-06-19 PROCEDURE — 85027 COMPLETE CBC AUTOMATED: CPT

## 2025-06-19 PROCEDURE — 84153 ASSAY OF PSA TOTAL: CPT

## 2025-06-19 PROCEDURE — 84443 ASSAY THYROID STIM HORMONE: CPT

## 2025-06-24 ENCOUNTER — RESULTS FOLLOW-UP (OUTPATIENT)
Dept: MEDICAL GROUP | Facility: MEDICAL CENTER | Age: 60
End: 2025-06-24

## (undated) DEVICE — GLOVE BIOGEL PI INDICATOR SZ 6.5 SURGICAL PF LF - (50/BX 4BX/CA)

## (undated) DEVICE — SENSOR SPO2 NEO LNCS ADHESIVE (20/BX) SEE USER NOTES

## (undated) DEVICE — DRAPE U SPLIT IMP 54 X 76 - (24/CA)

## (undated) DEVICE — CHLORAPREP 26 ML APPLICATOR - ORANGE TINT(25/CA)

## (undated) DEVICE — SODIUM CHL. IRRIGATION 0.9% 3000ML (4EA/CA 65CA/PF)

## (undated) DEVICE — TUBE E-T HI-LO CUFF 7.5MM (10EA/PK)

## (undated) DEVICE — SLING ORTH UNV TIETX VLFM ARM

## (undated) DEVICE — GLOVE BIOGEL PI ORTHO SZ 7.5 PF LF (40PR/BX)

## (undated) DEVICE — TAPE CLOTH MEDIPORE 6 INCH - (12RL/CA)

## (undated) DEVICE — BLOCK

## (undated) DEVICE — SHAVER4.0 AGGRESSIVE + FORMLA (5EA/BX)

## (undated) DEVICE — TUBING PATIENT W/CONNECTOR REDEUCE (1EA)

## (undated) DEVICE — DRAPETIBURON SHOULDER W/POUCH - (5EA/CA)

## (undated) DEVICE — LACTATED RINGERS INJ 1000 ML - (14EA/CA 60CA/PF)

## (undated) DEVICE — SHAVER 5.5 RESECTOR FORMULA (5EA/BX )

## (undated) DEVICE — ELECTRODE DUAL RETURN W/ CORD - (50/PK)

## (undated) DEVICE — CANISTER SUCTION RIGID RED 1500CC (40EA/CA)

## (undated) DEVICE — WATER IRRIGATION STERILE 1000ML (12EA/CA)

## (undated) DEVICE — ELECTRODE 850 FOAM ADHESIVE - HYDROGEL RADIOTRNSPRNT (50/PK)

## (undated) DEVICE — NEPTUNE 4 PORT MANIFOLD - (20/PK)

## (undated) DEVICE — BAG, SPONGE COUNT 50600

## (undated) DEVICE — TUBE CONNECTING SUCTION - CLEAR PLASTIC STERILE 72 IN (50EA/CA)

## (undated) DEVICE — GOWN WARMING STANDARD FLEX - (30/CA)

## (undated) DEVICE — GLOVE SZ 7.5 LF PROTEXIS (50PR/BX)

## (undated) DEVICE — SYRINGE 30 ML LL (56/BX)

## (undated) DEVICE — GLOVE BIOGEL INDICATOR SZ 8 SURGICAL PF LTX - (50/BX 4BX/CA)

## (undated) DEVICE — NEEDLE EXPRESSEW III (5EA/PK)

## (undated) DEVICE — HUMID-VENT HEAT AND MOISTURE EXCHANGE- (50/BX)

## (undated) DEVICE — DRESSING XEROFORM 1X8 - (50/BX 4BX/CA)

## (undated) DEVICE — SUTURE GENERAL

## (undated) DEVICE — NEEDLE SAFETY 18 GA X 1 1/2 IN (100EA/BX)

## (undated) DEVICE — KIT ROOM DECONTAMINATION

## (undated) DEVICE — SUTURE 3-0 ETHILON FS-1 - (36/BX) 30 INCH

## (undated) DEVICE — GLOVE BIOGEL SZ 6.5 SURGICAL PF LTX (50PR/BX 4BX/CA)

## (undated) DEVICE — MASK ANESTHESIA ADULT  - (100/CA)

## (undated) DEVICE — PACK SHOULDER ARTHROSCOPY SM - (2EA/CA)

## (undated) DEVICE — CANNULA GEMINI PASSPORT 20MM - (5/BX)

## (undated) DEVICE — HEAD HOLDER JUNIOR/ADULT

## (undated) DEVICE — PROTECTOR ULNA NERVE - (36PR/CA)

## (undated) DEVICE — ARTHROWAND TURBOVAC 3.5/90 SCT

## (undated) DEVICE — GLOVE, LITE (PAIR)

## (undated) DEVICE — TUBING PUMP WITH CONNECTOR REDEUCE (1EA)

## (undated) DEVICE — SPIDER SHOULDER HOLDER (12EA/BX)

## (undated) DEVICE — GLOVE BIOGEL INDICATOR SZ 7.5 SURGICAL PF LTX - (50PR/BX 4BX/CA)

## (undated) DEVICE — SUCTION INSTRUMENT YANKAUER BULBOUS TIP W/O VENT (50EA/CA)

## (undated) DEVICE — SODIUM CHL IRRIGATION 0.9% 1000ML (12EA/CA)

## (undated) DEVICE — SPONGE GAUZESTER 4 X 4 4PLY - (128PK/CA)

## (undated) DEVICE — KIT ANESTHESIA W/CIRCUIT & 3/LT BAG W/FILTER (20EA/CA)

## (undated) DEVICE — GLOVE BIOGEL SZ 7 SURGICAL PF LTX - (50PR/BX 4BX/CA)